# Patient Record
Sex: MALE | Race: WHITE | Employment: OTHER | URBAN - METROPOLITAN AREA
[De-identification: names, ages, dates, MRNs, and addresses within clinical notes are randomized per-mention and may not be internally consistent; named-entity substitution may affect disease eponyms.]

---

## 2017-01-17 DIAGNOSIS — B35.1 ONYCHOMYCOSIS: ICD-10-CM

## 2017-01-18 RX ORDER — TERBINAFINE HYDROCHLORIDE 250 MG/1
TABLET ORAL
Qty: 90 TABLET | Refills: 0 | OUTPATIENT
Start: 2017-01-18

## 2017-02-06 ENCOUNTER — OFFICE VISIT (OUTPATIENT)
Dept: ENDOCRINOLOGY | Age: 67
End: 2017-02-06

## 2017-02-06 VITALS
HEART RATE: 79 BPM | BODY MASS INDEX: 34.75 KG/M2 | OXYGEN SATURATION: 93 % | WEIGHT: 234.6 LBS | HEIGHT: 69 IN | DIASTOLIC BLOOD PRESSURE: 76 MMHG | SYSTOLIC BLOOD PRESSURE: 138 MMHG | RESPIRATION RATE: 16 BRPM

## 2017-02-06 DIAGNOSIS — E78.2 MIXED HYPERLIPIDEMIA: ICD-10-CM

## 2017-02-06 DIAGNOSIS — I10 ESSENTIAL HYPERTENSION: ICD-10-CM

## 2017-02-06 LAB — HBA1C MFR BLD: 6.5 %

## 2017-02-06 PROCEDURE — 83036 HEMOGLOBIN GLYCOSYLATED A1C: CPT | Performed by: INTERNAL MEDICINE

## 2017-02-06 PROCEDURE — 99214 OFFICE O/P EST MOD 30 MIN: CPT | Performed by: INTERNAL MEDICINE

## 2017-04-17 ENCOUNTER — OFFICE VISIT (OUTPATIENT)
Dept: FAMILY MEDICINE CLINIC | Age: 67
End: 2017-04-17

## 2017-04-17 VITALS
HEART RATE: 74 BPM | RESPIRATION RATE: 16 BRPM | SYSTOLIC BLOOD PRESSURE: 124 MMHG | DIASTOLIC BLOOD PRESSURE: 70 MMHG | WEIGHT: 235 LBS | BODY MASS INDEX: 34.8 KG/M2 | OXYGEN SATURATION: 97 % | HEIGHT: 69 IN

## 2017-04-17 DIAGNOSIS — Z79.4 CONTROLLED TYPE 2 DIABETES MELLITUS WITH COMPLICATION, WITH LONG-TERM CURRENT USE OF INSULIN (HCC): ICD-10-CM

## 2017-04-17 DIAGNOSIS — E78.2 MIXED HYPERLIPIDEMIA: ICD-10-CM

## 2017-04-17 DIAGNOSIS — Z00.00 ANNUAL PHYSICAL EXAM: Primary | ICD-10-CM

## 2017-04-17 DIAGNOSIS — E11.8 CONTROLLED TYPE 2 DIABETES MELLITUS WITH COMPLICATION, WITH LONG-TERM CURRENT USE OF INSULIN (HCC): ICD-10-CM

## 2017-04-17 DIAGNOSIS — I10 ESSENTIAL HYPERTENSION: ICD-10-CM

## 2017-04-17 LAB
A/G RATIO: 1.7 (ref 1.1–2.2)
ALBUMIN SERPL-MCNC: 4.3 G/DL (ref 3.4–5)
ALP BLD-CCNC: 50 U/L (ref 40–129)
ALT SERPL-CCNC: 20 U/L (ref 10–40)
ANION GAP SERPL CALCULATED.3IONS-SCNC: 17 MMOL/L (ref 3–16)
AST SERPL-CCNC: 15 U/L (ref 15–37)
BILIRUB SERPL-MCNC: 0.3 MG/DL (ref 0–1)
BUN BLDV-MCNC: 21 MG/DL (ref 7–20)
CALCIUM SERPL-MCNC: 9.6 MG/DL (ref 8.3–10.6)
CHLORIDE BLD-SCNC: 102 MMOL/L (ref 99–110)
CHOLESTEROL, TOTAL: 122 MG/DL (ref 0–199)
CO2: 22 MMOL/L (ref 21–32)
CREAT SERPL-MCNC: 0.7 MG/DL (ref 0.8–1.3)
CREATININE URINE: 117.1 MG/DL (ref 39–259)
GFR AFRICAN AMERICAN: >60
GFR NON-AFRICAN AMERICAN: >60
GLOBULIN: 2.6 G/DL
GLUCOSE BLD-MCNC: 104 MG/DL (ref 70–99)
HCT VFR BLD CALC: 39.9 % (ref 40.5–52.5)
HDLC SERPL-MCNC: 26 MG/DL (ref 40–60)
HEMOGLOBIN: 13.4 G/DL (ref 13.5–17.5)
HEPATITIS C ANTIBODY INTERPRETATION: NORMAL
LDL CHOLESTEROL CALCULATED: 57 MG/DL
MCH RBC QN AUTO: 31.6 PG (ref 26–34)
MCHC RBC AUTO-ENTMCNC: 33.6 G/DL (ref 31–36)
MCV RBC AUTO: 94.1 FL (ref 80–100)
MICROALBUMIN UR-MCNC: 4.6 MG/DL
MICROALBUMIN/CREAT UR-RTO: 39.3 MG/G (ref 0–30)
PDW BLD-RTO: 13.4 % (ref 12.4–15.4)
PLATELET # BLD: 201 K/UL (ref 135–450)
PMV BLD AUTO: 9.4 FL (ref 5–10.5)
POTASSIUM SERPL-SCNC: 3.8 MMOL/L (ref 3.5–5.1)
RBC # BLD: 4.24 M/UL (ref 4.2–5.9)
SODIUM BLD-SCNC: 141 MMOL/L (ref 136–145)
TOTAL PROTEIN: 6.9 G/DL (ref 6.4–8.2)
TRIGL SERPL-MCNC: 197 MG/DL (ref 0–150)
VLDLC SERPL CALC-MCNC: 39 MG/DL
WBC # BLD: 6.3 K/UL (ref 4–11)

## 2017-04-17 PROCEDURE — 99396 PREV VISIT EST AGE 40-64: CPT | Performed by: FAMILY MEDICINE

## 2017-04-17 PROCEDURE — 36415 COLL VENOUS BLD VENIPUNCTURE: CPT | Performed by: FAMILY MEDICINE

## 2017-04-17 PROCEDURE — G8598 ASA/ANTIPLAT THER USED: HCPCS | Performed by: FAMILY MEDICINE

## 2017-04-18 LAB — HIV-1 AND HIV-2 ANTIBODIES: NORMAL

## 2017-04-20 DIAGNOSIS — E78.5 HYPERLIPIDEMIA: ICD-10-CM

## 2017-04-21 RX ORDER — GEMFIBROZIL 600 MG/1
TABLET, FILM COATED ORAL
Qty: 180 TABLET | Refills: 3 | Status: SHIPPED | OUTPATIENT
Start: 2017-04-21 | End: 2018-05-01 | Stop reason: SDUPTHER

## 2017-05-10 ENCOUNTER — OFFICE VISIT (OUTPATIENT)
Dept: ENDOCRINOLOGY | Age: 67
End: 2017-05-10

## 2017-05-10 VITALS
OXYGEN SATURATION: 96 % | SYSTOLIC BLOOD PRESSURE: 130 MMHG | BODY MASS INDEX: 34.9 KG/M2 | WEIGHT: 235.6 LBS | RESPIRATION RATE: 14 BRPM | DIASTOLIC BLOOD PRESSURE: 75 MMHG | HEART RATE: 71 BPM | HEIGHT: 69 IN

## 2017-05-10 DIAGNOSIS — I10 ESSENTIAL HYPERTENSION: ICD-10-CM

## 2017-05-10 DIAGNOSIS — E78.2 MIXED HYPERLIPIDEMIA: ICD-10-CM

## 2017-05-10 LAB — HBA1C MFR BLD: 6.6 %

## 2017-05-10 PROCEDURE — 83036 HEMOGLOBIN GLYCOSYLATED A1C: CPT | Performed by: INTERNAL MEDICINE

## 2017-05-10 PROCEDURE — G8598 ASA/ANTIPLAT THER USED: HCPCS | Performed by: INTERNAL MEDICINE

## 2017-05-10 PROCEDURE — 3044F HG A1C LEVEL LT 7.0%: CPT | Performed by: INTERNAL MEDICINE

## 2017-05-10 PROCEDURE — 99214 OFFICE O/P EST MOD 30 MIN: CPT | Performed by: INTERNAL MEDICINE

## 2017-05-10 PROCEDURE — 1123F ACP DISCUSS/DSCN MKR DOCD: CPT | Performed by: INTERNAL MEDICINE

## 2017-05-10 PROCEDURE — 4040F PNEUMOC VAC/ADMIN/RCVD: CPT | Performed by: INTERNAL MEDICINE

## 2017-05-10 PROCEDURE — 3017F COLORECTAL CA SCREEN DOC REV: CPT | Performed by: INTERNAL MEDICINE

## 2017-05-10 PROCEDURE — 1036F TOBACCO NON-USER: CPT | Performed by: INTERNAL MEDICINE

## 2017-05-10 PROCEDURE — G8417 CALC BMI ABV UP PARAM F/U: HCPCS | Performed by: INTERNAL MEDICINE

## 2017-05-10 PROCEDURE — G8427 DOCREV CUR MEDS BY ELIG CLIN: HCPCS | Performed by: INTERNAL MEDICINE

## 2017-05-18 RX ORDER — LISINOPRIL 40 MG/1
TABLET ORAL
Qty: 90 TABLET | Refills: 2 | Status: SHIPPED | OUTPATIENT
Start: 2017-05-18 | End: 2017-10-30 | Stop reason: SDUPTHER

## 2017-09-13 ENCOUNTER — OFFICE VISIT (OUTPATIENT)
Dept: ENDOCRINOLOGY | Age: 67
End: 2017-09-13

## 2017-09-13 VITALS
DIASTOLIC BLOOD PRESSURE: 70 MMHG | WEIGHT: 227.4 LBS | HEART RATE: 66 BPM | RESPIRATION RATE: 12 BRPM | OXYGEN SATURATION: 95 % | BODY MASS INDEX: 33.68 KG/M2 | SYSTOLIC BLOOD PRESSURE: 129 MMHG | HEIGHT: 69 IN

## 2017-09-13 DIAGNOSIS — I10 ESSENTIAL HYPERTENSION: Chronic | ICD-10-CM

## 2017-09-13 DIAGNOSIS — E78.2 MIXED HYPERLIPIDEMIA: Chronic | ICD-10-CM

## 2017-09-13 LAB — HBA1C MFR BLD: 6.8 %

## 2017-09-13 PROCEDURE — 1123F ACP DISCUSS/DSCN MKR DOCD: CPT | Performed by: INTERNAL MEDICINE

## 2017-09-13 PROCEDURE — 99214 OFFICE O/P EST MOD 30 MIN: CPT | Performed by: INTERNAL MEDICINE

## 2017-09-13 PROCEDURE — 83036 HEMOGLOBIN GLYCOSYLATED A1C: CPT | Performed by: INTERNAL MEDICINE

## 2017-09-13 PROCEDURE — 4040F PNEUMOC VAC/ADMIN/RCVD: CPT | Performed by: INTERNAL MEDICINE

## 2017-09-13 PROCEDURE — G8417 CALC BMI ABV UP PARAM F/U: HCPCS | Performed by: INTERNAL MEDICINE

## 2017-09-13 PROCEDURE — G8598 ASA/ANTIPLAT THER USED: HCPCS | Performed by: INTERNAL MEDICINE

## 2017-09-13 PROCEDURE — 1036F TOBACCO NON-USER: CPT | Performed by: INTERNAL MEDICINE

## 2017-09-13 PROCEDURE — 3017F COLORECTAL CA SCREEN DOC REV: CPT | Performed by: INTERNAL MEDICINE

## 2017-09-13 PROCEDURE — G8427 DOCREV CUR MEDS BY ELIG CLIN: HCPCS | Performed by: INTERNAL MEDICINE

## 2017-09-13 PROCEDURE — 3046F HEMOGLOBIN A1C LEVEL >9.0%: CPT | Performed by: INTERNAL MEDICINE

## 2017-09-27 ENCOUNTER — TELEPHONE (OUTPATIENT)
Dept: ENDOCRINOLOGY | Age: 67
End: 2017-09-27

## 2017-10-09 ENCOUNTER — TELEPHONE (OUTPATIENT)
Dept: ENDOCRINOLOGY | Age: 67
End: 2017-10-09

## 2017-10-09 ENCOUNTER — OFFICE VISIT (OUTPATIENT)
Dept: FAMILY MEDICINE CLINIC | Age: 67
End: 2017-10-09

## 2017-10-09 VITALS
TEMPERATURE: 98.3 F | WEIGHT: 226 LBS | HEART RATE: 76 BPM | BODY MASS INDEX: 33.37 KG/M2 | SYSTOLIC BLOOD PRESSURE: 132 MMHG | DIASTOLIC BLOOD PRESSURE: 68 MMHG | OXYGEN SATURATION: 96 %

## 2017-10-09 DIAGNOSIS — Z13.6 SCREENING FOR AAA (AORTIC ABDOMINAL ANEURYSM): ICD-10-CM

## 2017-10-09 DIAGNOSIS — H93.13 TINNITUS, BILATERAL: Primary | ICD-10-CM

## 2017-10-09 PROCEDURE — G8417 CALC BMI ABV UP PARAM F/U: HCPCS | Performed by: FAMILY MEDICINE

## 2017-10-09 PROCEDURE — G8484 FLU IMMUNIZE NO ADMIN: HCPCS | Performed by: FAMILY MEDICINE

## 2017-10-09 PROCEDURE — 4040F PNEUMOC VAC/ADMIN/RCVD: CPT | Performed by: FAMILY MEDICINE

## 2017-10-09 PROCEDURE — 99213 OFFICE O/P EST LOW 20 MIN: CPT | Performed by: FAMILY MEDICINE

## 2017-10-09 PROCEDURE — G8598 ASA/ANTIPLAT THER USED: HCPCS | Performed by: FAMILY MEDICINE

## 2017-10-09 PROCEDURE — 1123F ACP DISCUSS/DSCN MKR DOCD: CPT | Performed by: FAMILY MEDICINE

## 2017-10-09 PROCEDURE — 1036F TOBACCO NON-USER: CPT | Performed by: FAMILY MEDICINE

## 2017-10-09 PROCEDURE — 3017F COLORECTAL CA SCREEN DOC REV: CPT | Performed by: FAMILY MEDICINE

## 2017-10-09 PROCEDURE — G8427 DOCREV CUR MEDS BY ELIG CLIN: HCPCS | Performed by: FAMILY MEDICINE

## 2017-10-09 ASSESSMENT — ENCOUNTER SYMPTOMS
EYES NEGATIVE: 1
SHORTNESS OF BREATH: 0
ANAL BLEEDING: 0
ABDOMINAL PAIN: 0
CHEST TIGHTNESS: 0

## 2017-10-10 RX ORDER — CHLORTHALIDONE 25 MG/1
TABLET ORAL
Qty: 90 TABLET | Refills: 3 | Status: SHIPPED | OUTPATIENT
Start: 2017-10-10 | End: 2018-11-09 | Stop reason: SDUPTHER

## 2017-10-30 ENCOUNTER — OFFICE VISIT (OUTPATIENT)
Dept: CARDIOLOGY CLINIC | Age: 67
End: 2017-10-30

## 2017-10-30 VITALS
BODY MASS INDEX: 32.5 KG/M2 | OXYGEN SATURATION: 95 % | WEIGHT: 219.4 LBS | HEART RATE: 85 BPM | DIASTOLIC BLOOD PRESSURE: 66 MMHG | SYSTOLIC BLOOD PRESSURE: 116 MMHG | HEIGHT: 69 IN

## 2017-10-30 DIAGNOSIS — I25.10 CORONARY ARTERY DISEASE INVOLVING NATIVE CORONARY ARTERY OF NATIVE HEART WITHOUT ANGINA PECTORIS: Primary | ICD-10-CM

## 2017-10-30 DIAGNOSIS — E78.2 MIXED HYPERLIPIDEMIA: Chronic | ICD-10-CM

## 2017-10-30 DIAGNOSIS — I10 ESSENTIAL HYPERTENSION: Chronic | ICD-10-CM

## 2017-10-30 PROCEDURE — 1123F ACP DISCUSS/DSCN MKR DOCD: CPT | Performed by: INTERNAL MEDICINE

## 2017-10-30 PROCEDURE — 3017F COLORECTAL CA SCREEN DOC REV: CPT | Performed by: INTERNAL MEDICINE

## 2017-10-30 PROCEDURE — G8417 CALC BMI ABV UP PARAM F/U: HCPCS | Performed by: INTERNAL MEDICINE

## 2017-10-30 PROCEDURE — 93000 ELECTROCARDIOGRAM COMPLETE: CPT | Performed by: INTERNAL MEDICINE

## 2017-10-30 PROCEDURE — 4040F PNEUMOC VAC/ADMIN/RCVD: CPT | Performed by: INTERNAL MEDICINE

## 2017-10-30 PROCEDURE — G8427 DOCREV CUR MEDS BY ELIG CLIN: HCPCS | Performed by: INTERNAL MEDICINE

## 2017-10-30 PROCEDURE — 99214 OFFICE O/P EST MOD 30 MIN: CPT | Performed by: INTERNAL MEDICINE

## 2017-10-30 PROCEDURE — G8484 FLU IMMUNIZE NO ADMIN: HCPCS | Performed by: INTERNAL MEDICINE

## 2017-10-30 PROCEDURE — 1036F TOBACCO NON-USER: CPT | Performed by: INTERNAL MEDICINE

## 2017-10-30 PROCEDURE — G8598 ASA/ANTIPLAT THER USED: HCPCS | Performed by: INTERNAL MEDICINE

## 2017-10-30 RX ORDER — LISINOPRIL 40 MG/1
TABLET ORAL
Qty: 90 TABLET | Refills: 3 | Status: SHIPPED | OUTPATIENT
Start: 2017-10-30 | End: 2018-12-06 | Stop reason: SDUPTHER

## 2017-10-30 RX ORDER — CARVEDILOL 25 MG/1
TABLET ORAL
Qty: 180 TABLET | Refills: 4 | Status: SHIPPED | OUTPATIENT
Start: 2017-10-30 | End: 2018-12-03 | Stop reason: SDUPTHER

## 2017-10-30 RX ORDER — ATORVASTATIN CALCIUM 80 MG/1
TABLET, FILM COATED ORAL
Qty: 90 TABLET | Refills: 3 | Status: SHIPPED | OUTPATIENT
Start: 2017-10-30 | End: 2018-12-03 | Stop reason: SDUPTHER

## 2017-10-30 NOTE — PROGRESS NOTES
[address] Office Note  10/30/2017     Subjective:  Mr. Clary Larsen is 74 y.o. male presents today for cardiology follow up of  CAD, HLD and DM. Today he reports he feels very well overall. Denies chest pain, shortness of breath, edema, dizziness, palpitations and syncope. HPI:    Known history of CAD stent LAD 2006 DM HBP Hyperlipidemia    He is on secondary prevention for CAD and Is compliant with therapy. Review of Systems: 12 point ROS negative in all areas as listed below except as in [address]  Denies edema orthopnea PND or angina. Constitutional, EENT, Cardiovascular, pulmonary, GI, , Musculoskeletal, skin, neurological, hematological, endocrine, Psychiatric    Reviewed past medical history, social, and family history. Nonsmoker no alcohol or recreational drugs  Past Medical History:   Diagnosis Date    CAD (coronary artery disease)     Heart attack 3/2006    Hyperlipidemia     Hypertension     PONV (postoperative nausea and vomiting)     Type II or unspecified type diabetes mellitus without mention of complication, not stated as uncontrolled      Past Surgical History:   Procedure Laterality Date    CARDIAC SURGERY  2006    PTCA, stent LAD, Dr Nichols Code  2008    right rotator cuff tear, [place]    SHOULDER SURGERY Left 1/2015    TONSILLECTOMY  1979       Objective:   /66   Pulse 85   Ht 5' 9\" (1.753 m)   Wt 219 lb 6.4 oz (99.5 kg)   SpO2 95%   BMI 32.40 kg/m²     Wt Readings from Last 3 Encounters:   10/30/17 219 lb 6.4 oz (99.5 kg)   10/09/17 226 lb (102.5 kg)   09/13/17 227 lb 6.4 oz (103.1 kg)       Physical Exam:  General: No Respiratory distress, appears well developed and well nourished.    Eyes:  Sclera nonicteric  Nose/Sinuses:  negative findings: nose shows no deformity, asymmetry, or inflammation, nasal mucosa normal, septum midline with no perforation or bleeding  Back:  no pain to palpation  Joint:  no 2/27/2015 08:52   Ref.  Range 1/29/2015 07:07   Sodium Latest Range: 136-145 mmol/L 143   Potassium Latest Range: 3.5-5.1 mmol/L 4.1   Chloride Latest Range:  mmol/L 104   CO2 Latest Range: 21-32 mmol/L 24   Anion Gap Latest Range: 3-16  15   Glucose Latest Range: 70-99 mg/dL 112 (H)   BUN Latest Range: 7-20 mg/dL 21 (H)   Creatinine Latest Range: 0.8-1.3 mg/dL 0.8   Alk Phos Latest Range:  U/L 62   Calcium Latest Range: 8.3-10.6 mg/dL 9.5   Albumin/Globulin Ratio Latest Range: 1.1-2.2  1.3   GFR  Latest Range: >60  >60   GFR Non- Latest Range: >60  >60   Total Protein Latest Range: 6.4-8.2 g/dL 7.5   Albumin Latest Range: 3.4-5.0 g/dL 4.3   ALT Latest Range: 10-40 U/L 17   AST Latest Range: 15-37 U/L 15   Bilirubin Latest Range: 0.0-1.0 mg/dL 0.3   Cholesterol, Total Latest Range: 0-199 mg/dL 102   HDL Cholesterol Latest Range: 40-60 mg/dl 23 (L)   LDL Calculated Latest Range: <100 mg/dL 57   Triglycerides Latest Range: 0-150 mg/dL 111   VLDL CHOLESTEROL CALCULATED Latest Range: Not Established mg/dL 22   Globulin No range found 3.2   eAG (mg/dL) No range found 148.5   Hemoglobin A1C Latest Range: See comment % 6.8     CBC: 12/8/14  BMP: 1/29/15  LIVER PROFILE: .  LIPID: 1/29/15  Lab Results   Component Value Date    CHOL 122 04/17/2017    CHOL 128 03/31/2016    CHOL 107 12/10/2015         CHOL                    128      3/31/16  Lab Results   Component Value Date    TRIG 197 (H) 04/17/2017    TRIG 99 03/31/2016    TRIG 211 (H) 12/10/2015         TRIG   99      3/31/16   Lab Results   Component Value Date    HDL 26 (L) 04/17/2017    HDL 31 (L) 03/31/2016    HDL 22 (L) 12/10/2015        HDL                        31      3/31/16  Lab Results   Component Value Date    LDLCALC 57 04/17/2017    LDLCALC 77 03/31/2016    LDLCALC 43 12/10/2015        LDL                        77      3/31/16  Lab Results   Component Value Date    LABVLDL 39 04/17/2017    LABVLDL 20 03/31/2016 Optimal control Lipids 1/29/15 , HDL 23, LDL 57, Trig 111   DM: Stable followed by PCP    Plan:  1. Continue current medication regimen, reviewed with patient  2. Healthy lifestyle education reviewed including nutrition, exercise and activity  3.  Follow up with me as scheduled in a year    Viola Koenig MD 10/30/2017 8:20 AM

## 2017-10-30 NOTE — LETTER
[address] Cardiology - [address] [place] [id number]  Phone: [phone]  Fax: [phone] [address] [place], [place]        October 30, 2017     Amadeo Johnson    Patient: Ray Quintanilla  MR Number: [id number]  YOB: 1950  Date of Visit: 10/30/2017    Dear Dr. Marjan Rocha:    Thank you for the request for consultation for Ray Quintanilla to me for the evaluation. Below are the relevant portions of my assessment and plan of care. [facility] Office Note  10/30/2017     Subjective:  Mr. Raissa Kline is 74 y.o. male presents today for cardiology follow up of  CAD, HLD and DM. Today he reports he feels very well overall. Denies chest pain, shortness of breath, edema, dizziness, palpitations and syncope. HPI:    Known history of CAD stent LAD 2006 DM HBP Hyperlipidemia    He is on secondary prevention for CAD and Is compliant with therapy. Review of Systems: 12 point ROS negative in all areas as listed below except as in [address]  Denies edema orthopnea PND or angina. Constitutional, EENT, Cardiovascular, pulmonary, GI, , Musculoskeletal, skin, neurological, hematological, endocrine, Psychiatric    Reviewed past medical history, social, and family history.    Nonsmoker no alcohol or recreational drugs  Past Medical History:   Diagnosis Date    CAD (coronary artery disease)     Heart attack 3/2006    Hyperlipidemia     Hypertension     PONV (postoperative nausea and vomiting)     Type II or unspecified type diabetes mellitus without mention of complication, not stated as uncontrolled      Past Surgical History:   Procedure Laterality Date    CARDIAC SURGERY  2006    PTCA, stent JAIR, Dr Kisha Newman  2008    right rotator cuff tear, [place]    SHOULDER SURGERY Left 1/2015    TONSILLECTOMY  1979       Objective: /66   Pulse 85   Ht 5' 9\" (1.753 m)   Wt 219 lb 6.4 oz (99.5 kg)   SpO2 95%   BMI 32.40 kg/m²      Wt Readings from Last 3 Encounters:   10/30/17 219 lb 6.4 oz (99.5 kg)   10/09/17 226 lb (102.5 kg)   09/13/17 227 lb 6.4 oz (103.1 kg)       Physical Exam:  General: No Respiratory distress, appears well developed and well nourished. Eyes:  Sclera nonicteric  Nose/Sinuses:  negative findings: nose shows no deformity, asymmetry, or inflammation, nasal mucosa normal, septum midline with no perforation or bleeding  Back:  no pain to palpation  Joint:  no active joint inflammation  Musculoskeletal:  negative  Skin:  Warm and dry  Neck:  Negative for JVD and Carotid Bruits. Chest:  Clear to auscultation, respiration easy  Cardiovascular:  RRR, S1S2 normal, no murmur, no rub or thrill. Abdomen:  Soft normal liver and spleen  Extremities:   No edema, clubbing, cyanosis,  Pulses: Femoral and pedal pulses are normal.  Neuro: intact    Medications:   Outpatient Encounter Prescriptions as of 10/30/2017   Medication Sig Dispense Refill    chlorthalidone (HYGROTON) 25 MG tablet TAKE ONE TABLET BY MOUTH ONE TIME A DAY 90 tablet 3    insulin regular human (HUMULIN R U-500 KWIKPEN) 500 UNIT/ML SOPN concentrated injection pen Inject 60-80 units before breakfast, 60- 80 units before supper.  10 Pen 2    Liraglutide (VICTOZA) 18 MG/3ML SOPN SC injection Inject 1.2 mg into the skin daily 6 Pen 3    lisinopril (PRINIVIL;ZESTRIL) 40 MG tablet TAKE ONE TABLET BY MOUTH ONE TIME A DAY 90 tablet 2    gemfibrozil (LOPID) 600 MG tablet TAKE ONE TABLET BY MOUTH TWICE A  tablet 3    atorvastatin (LIPITOR) 80 MG tablet TAKE 1 TABLET BY MOUTH ONCE DAILY 90 tablet 3    carvedilol (COREG) 25 MG tablet TAKE ONE TABLET BY MOUTH TWICE A DAY WITH MEALS 180 tablet 4    Insulin Pen Needle 32G X 4 MM MISC 1 each by Does not apply route 3 times daily 30 each 3    AGAMATRIX PRESTO TEST strip USE TO TEST 4 TIMES DAILY 400 each 3  Blood Glucose Monitoring Suppl (AGAMATRIX PRESTO) W/DEVICE KIT 1 each by Does not apply route 5 times daily 1 kit 0    AGAMATRIX ULTRA-THIN LANCETS MISC 1 each by Does not apply route 4 times daily.  Omega-3 Fatty Acids (FISH OIL) 1000 MG CAPS Take 3,000 mg by mouth 6 times daily.  aspirin 81 MG tablet Take 81 mg by mouth daily.  Multiple Vitamins-Minerals (MULTIVITAMIN PO) Take  by mouth daily. No facility-administered encounter medications on file as of 10/30/2017. Lab Data:  Results for Staci Livingston (MRN [id number]) as of 2/27/2015 08:52   Ref.  Range 1/29/2015 07:07   Sodium Latest Range: 136-145 mmol/L 143   Potassium Latest Range: 3.5-5.1 mmol/L 4.1   Chloride Latest Range:  mmol/L 104   CO2 Latest Range: 21-32 mmol/L 24   Anion Gap Latest Range: 3-16  15   Glucose Latest Range: 70-99 mg/dL 112 (H)   BUN Latest Range: 7-20 mg/dL 21 (H)   Creatinine Latest Range: 0.8-1.3 mg/dL 0.8   Alk Phos Latest Range:  U/L 62   Calcium Latest Range: 8.3-10.6 mg/dL 9.5   Albumin/Globulin Ratio Latest Range: 1.1-2.2  1.3   GFR  Latest Range: >60  >60   GFR Non- Latest Range: >60  >60   Total Protein Latest Range: 6.4-8.2 g/dL 7.5   Albumin Latest Range: 3.4-5.0 g/dL 4.3   ALT Latest Range: 10-40 U/L 17   AST Latest Range: 15-37 U/L 15   Bilirubin Latest Range: 0.0-1.0 mg/dL 0.3   Cholesterol, Total Latest Range: 0-199 mg/dL 102   HDL Cholesterol Latest Range: 40-60 mg/dl 23 (L)   LDL Calculated Latest Range: <100 mg/dL 57   Triglycerides Latest Range: 0-150 mg/dL 111   VLDL CHOLESTEROL CALCULATED Latest Range: Not Established mg/dL 22   Globulin No range found 3.2   eAG (mg/dL) No range found 148.5   Hemoglobin A1C Latest Range: See comment % 6.8     CBC: 12/8/14  BMP: 1/29/15  LIVER PROFILE: .  LIPID: 1/29/15  Lab Results   Component Value Date    CHOL 122 04/17/2017    CHOL 128 03/31/2016    CHOL 107 12/10/2015 leaflets and trivial mitral regurgitation. The aortic valve is  structurally normal. The tricuspid valve is structurally. The  pulmonic valve is structurally normal. There is no significant  pericardial effusion. The aortic root is borderline dilated. The  vena cava is not well seen. IMPRESSION-   1. Hypertrophied left ventricle with normal global systolic  function, with wall motion abnormalities as above. 2. Stage I diastolic dysfunction of the left ventricle. 3. Mild left atrial enlargement. 4. Mitral anular calcification. 5. Borderline dilated aortic root. Assessment:  HTN:  controlled  CAD:   no angina or heart failure. HLD:  Optimal control Lipids 1/29/15 , HDL 23, LDL 57, Trig 111   DM: Stable followed by PCP    Plan:  1. Continue current medication regimen, reviewed with patient  2. Healthy lifestyle education reviewed including nutrition, exercise and activity  3. Follow up with me as scheduled in a year    Migel Sanchez MD 10/30/2017 8:20 AM          If you have questions, please do not hesitate to call me. I look forward to following Alex Edmondson along with you.     Sincerely,        Rudy Herron MD

## 2017-10-30 NOTE — COMMUNICATION BODY
active joint inflammation  Musculoskeletal:  negative  Skin:  Warm and dry  Neck:  Negative for JVD and Carotid Bruits. Chest:  Clear to auscultation, respiration easy  Cardiovascular:  RRR, S1S2 normal, no murmur, no rub or thrill. Abdomen:  Soft normal liver and spleen  Extremities:   No edema, clubbing, cyanosis,  Pulses: Femoral and pedal pulses are normal.  Neuro: intact    Medications:   Outpatient Encounter Prescriptions as of 10/30/2017   Medication Sig Dispense Refill    chlorthalidone (HYGROTON) 25 MG tablet TAKE ONE TABLET BY MOUTH ONE TIME A DAY 90 tablet 3    insulin regular human (HUMULIN R U-500 KWIKPEN) 500 UNIT/ML SOPN concentrated injection pen Inject 60-80 units before breakfast, 60- 80 units before supper. 10 Pen 2    Liraglutide (VICTOZA) 18 MG/3ML SOPN SC injection Inject 1.2 mg into the skin daily 6 Pen 3    lisinopril (PRINIVIL;ZESTRIL) 40 MG tablet TAKE ONE TABLET BY MOUTH ONE TIME A DAY 90 tablet 2    gemfibrozil (LOPID) 600 MG tablet TAKE ONE TABLET BY MOUTH TWICE A  tablet 3    atorvastatin (LIPITOR) 80 MG tablet TAKE 1 TABLET BY MOUTH ONCE DAILY 90 tablet 3    carvedilol (COREG) 25 MG tablet TAKE ONE TABLET BY MOUTH TWICE A DAY WITH MEALS 180 tablet 4    Insulin Pen Needle 32G X 4 MM MISC 1 each by Does not apply route 3 times daily 30 each 3    AGAMATRIX PRESTO TEST strip USE TO TEST 4 TIMES DAILY 400 each 3    Blood Glucose Monitoring Suppl (AGAMATRIX PRESTO) W/DEVICE KIT 1 each by Does not apply route 5 times daily 1 kit 0    AGAMATRIX ULTRA-THIN LANCETS MISC 1 each by Does not apply route 4 times daily.  Omega-3 Fatty Acids (FISH OIL) 1000 MG CAPS Take 3,000 mg by mouth 6 times daily.  aspirin 81 MG tablet Take 81 mg by mouth daily.  Multiple Vitamins-Minerals (MULTIVITAMIN PO) Take  by mouth daily. No facility-administered encounter medications on file as of 10/30/2017.          Lab Data:  Results for Юлия Mccoy (MRN [id number]) as of 2/27/2015 08:52   Ref.  Range 1/29/2015 07:07   Sodium Latest Range: 136-145 mmol/L 143   Potassium Latest Range: 3.5-5.1 mmol/L 4.1   Chloride Latest Range:  mmol/L 104   CO2 Latest Range: 21-32 mmol/L 24   Anion Gap Latest Range: 3-16  15   Glucose Latest Range: 70-99 mg/dL 112 (H)   BUN Latest Range: 7-20 mg/dL 21 (H)   Creatinine Latest Range: 0.8-1.3 mg/dL 0.8   Alk Phos Latest Range:  U/L 62   Calcium Latest Range: 8.3-10.6 mg/dL 9.5   Albumin/Globulin Ratio Latest Range: 1.1-2.2  1.3   GFR  Latest Range: >60  >60   GFR Non- Latest Range: >60  >60   Total Protein Latest Range: 6.4-8.2 g/dL 7.5   Albumin Latest Range: 3.4-5.0 g/dL 4.3   ALT Latest Range: 10-40 U/L 17   AST Latest Range: 15-37 U/L 15   Bilirubin Latest Range: 0.0-1.0 mg/dL 0.3   Cholesterol, Total Latest Range: 0-199 mg/dL 102   HDL Cholesterol Latest Range: 40-60 mg/dl 23 (L)   LDL Calculated Latest Range: <100 mg/dL 57   Triglycerides Latest Range: 0-150 mg/dL 111   VLDL CHOLESTEROL CALCULATED Latest Range: Not Established mg/dL 22   Globulin No range found 3.2   eAG (mg/dL) No range found 148.5   Hemoglobin A1C Latest Range: See comment % 6.8     CBC: 12/8/14  BMP: 1/29/15  LIVER PROFILE: .  LIPID: 1/29/15  Lab Results   Component Value Date    CHOL 122 04/17/2017    CHOL 128 03/31/2016    CHOL 107 12/10/2015         CHOL                    128      3/31/16  Lab Results   Component Value Date    TRIG 197 (H) 04/17/2017    TRIG 99 03/31/2016    TRIG 211 (H) 12/10/2015         TRIG   99      3/31/16   Lab Results   Component Value Date    HDL 26 (L) 04/17/2017    HDL 31 (L) 03/31/2016    HDL 22 (L) 12/10/2015        HDL                        31      3/31/16  Lab Results   Component Value Date    LDLCALC 57 04/17/2017    LDLCALC 77 03/31/2016    LDLCALC 43 12/10/2015        LDL                        77      3/31/16  Lab Results   Component Value Date    LABVLDL 39 04/17/2017    LABVLDL 20 03/31/2016

## 2017-11-02 ENCOUNTER — HOSPITAL ENCOUNTER (OUTPATIENT)
Dept: OTHER | Age: 67
Discharge: HOME | End: 2017-11-02
Attending: INTERNAL MEDICINE | Admitting: INTERNAL MEDICINE

## 2017-11-02 ENCOUNTER — PATIENT MESSAGE (OUTPATIENT)
Dept: FAMILY MEDICINE CLINIC | Age: 67
End: 2017-11-02

## 2017-11-02 LAB
A/G RATIO: 1.7 (ref 1.1–2.2)
ALBUMIN SERPL-MCNC: 4.6 G/DL (ref 3.4–5)
ALP BLD-CCNC: 50 U/L (ref 40–129)
ALT SERPL-CCNC: 18 U/L (ref 10–40)
ANION GAP SERPL CALCULATED.3IONS-SCNC: 16 MMOL/L (ref 3–16)
AST SERPL-CCNC: 16 U/L (ref 15–37)
BILIRUB SERPL-MCNC: 0.4 MG/DL (ref 0–1)
BUN BLDV-MCNC: 26 MG/DL (ref 7–20)
CALCIUM SERPL-MCNC: 9.7 MG/DL (ref 8.3–10.6)
CHLORIDE BLD-SCNC: 101 MMOL/L (ref 99–110)
CHOLESTEROL, TOTAL: 108 MG/DL (ref 0–199)
CO2: 24 MMOL/L (ref 21–32)
CREAT SERPL-MCNC: 0.9 MG/DL (ref 0.8–1.3)
CREATININE URINE: 112.2 MG/DL (ref 39–259)
ESTIMATED AVERAGE GLUCOSE: 148.5 MG/DL
GFR AFRICAN AMERICAN: >60
GFR NON-AFRICAN AMERICAN: >60
GLOBULIN: 2.7 G/DL
GLUCOSE BLD-MCNC: 163 MG/DL (ref 70–99)
HBA1C MFR BLD: 6.8 %
HDLC SERPL-MCNC: 30 MG/DL (ref 40–60)
LDL CHOLESTEROL CALCULATED: 59 MG/DL
MICROALBUMIN UR-MCNC: 4.7 MG/DL
MICROALBUMIN/CREAT UR-RTO: 41.9 MG/G (ref 0–30)
POTASSIUM SERPL-SCNC: 3.8 MMOL/L (ref 3.5–5.1)
SODIUM BLD-SCNC: 141 MMOL/L (ref 136–145)
TOTAL PROTEIN: 7.3 G/DL (ref 6.4–8.2)
TRIGL SERPL-MCNC: 96 MG/DL (ref 0–150)
VLDLC SERPL CALC-MCNC: 19 MG/DL

## 2017-11-03 RX ORDER — AMOXICILLIN 875 MG/1
875 TABLET, COATED ORAL 2 TIMES DAILY
Qty: 14 TABLET | Refills: 0 | Status: SHIPPED | OUTPATIENT
Start: 2017-11-03 | End: 2017-11-10

## 2017-11-03 NOTE — TELEPHONE ENCOUNTER
I pended amoxil 875 1 bid x 7 days, but I don't know where to send it.   Pls contact pt and send to local pharm of his choice. [email or web address].

## 2017-11-17 ENCOUNTER — TELEPHONE (OUTPATIENT)
Dept: FAMILY MEDICINE CLINIC | Age: 67
End: 2017-11-17

## 2017-11-21 ENCOUNTER — NURSE ONLY (OUTPATIENT)
Dept: FAMILY MEDICINE CLINIC | Age: 67
End: 2017-11-21

## 2017-11-21 DIAGNOSIS — Z23 NEED FOR PNEUMOCOCCAL VACCINATION: Primary | ICD-10-CM

## 2017-11-21 DIAGNOSIS — Z23 NEED FOR INFLUENZA VACCINATION: ICD-10-CM

## 2017-11-21 PROCEDURE — 90471 IMMUNIZATION ADMIN: CPT | Performed by: FAMILY MEDICINE

## 2017-11-21 PROCEDURE — 90472 IMMUNIZATION ADMIN EACH ADD: CPT | Performed by: FAMILY MEDICINE

## 2017-11-21 PROCEDURE — 90662 IIV NO PRSV INCREASED AG IM: CPT | Performed by: FAMILY MEDICINE

## 2017-11-21 PROCEDURE — 90670 PCV13 VACCINE IM: CPT | Performed by: FAMILY MEDICINE

## 2017-11-21 NOTE — PROGRESS NOTES
Vaccine Information Sheet, \"Influenza - Inactivated\"  given to Chuyita Lovett, or parent/legal guardian of  Chuyita Lovett and verbalized understanding. Patient responses:    Have you ever had a reaction to a flu vaccine? No  Are you able to eat eggs without adverse effects? Yes  Do you have any current illness? No  Have you ever had Guillian Cooks Syndrome? No    Flu vaccine given per order. Please see immunization tab.

## 2017-12-06 ENCOUNTER — CLINICAL DOCUMENTATION (OUTPATIENT)
Dept: PHARMACY | Facility: CLINIC | Age: 67
End: 2017-12-06

## 2017-12-06 NOTE — LETTER
Cherelle Hendricks   [address] [place] [address]           12/06/17     Dear Garo Barron! You have completed the requirements to participate in the [facility]) Diabetes Management program for 2018. What you will receive? You will receive beginning Jan. 1 up to $600 in waived copays ($300 if entering the program on July 1) for specific medications and pharmacy-related supplies purchased through the [facility]. A list of these items is available on the [place] [employer] Diabetes Management page (Employee Quick Links > Human Resources > Benefits and Well Being > Diabetes Management) and on [employer] under the Diabetes Management tile. In addition, clinical support will be available if your A1c becomes greater than 8 percent. To reduce your health risks, our care coordinators and diabetes educators will assist you in better controlling your condition. What you will need to do? To maintain your benefit throughout the year and be eligible to receive the benefit next year, you must make sure you complete all the following ongoing requirements:  ? Requirements to be completed throughout year:  ? Take diabetes medication as prescribed as well as cholesterol (Statin) or high blood pressure (ACE/ARB), if needed. o 70% adherence is required of diabetes medications  o Provide documentation if cholesterol and/or high blood pressure medications are not needed. ? Lipid panel (once yearly)  ? Urine albumin (once yearly)  ? Pneumonia Vaccination (as indicated)  ? Requirements to be completed between Jan. 1 and June 30:  ? Visit with your physician (First yearly visit)  ? Meet with a [address] [place] [address] pharmacist in person at a hospital location or by phone (This visit may be conducted prior to the start of the requirements period.)  ? First A1c  ?  Requirements to be completed between July 1 and Dec. 31

## 2017-12-06 NOTE — PROGRESS NOTES
Pharmacy Pop Care Documentation:      The application for Chuyita Lovett for enrollment into the diabetes management program has been reviewed and accepted on 12/6/17.     Lenka Little

## 2017-12-20 ENCOUNTER — OFFICE VISIT (OUTPATIENT)
Dept: ENDOCRINOLOGY | Age: 67
End: 2017-12-20

## 2017-12-20 VITALS
RESPIRATION RATE: 14 BRPM | WEIGHT: 216.2 LBS | BODY MASS INDEX: 32.02 KG/M2 | DIASTOLIC BLOOD PRESSURE: 67 MMHG | HEART RATE: 70 BPM | OXYGEN SATURATION: 96 % | HEIGHT: 69 IN | SYSTOLIC BLOOD PRESSURE: 119 MMHG

## 2017-12-20 DIAGNOSIS — E78.2 MIXED HYPERLIPIDEMIA: Chronic | ICD-10-CM

## 2017-12-20 DIAGNOSIS — I10 ESSENTIAL HYPERTENSION: Chronic | ICD-10-CM

## 2017-12-20 PROCEDURE — 4040F PNEUMOC VAC/ADMIN/RCVD: CPT | Performed by: INTERNAL MEDICINE

## 2017-12-20 PROCEDURE — 3017F COLORECTAL CA SCREEN DOC REV: CPT | Performed by: INTERNAL MEDICINE

## 2017-12-20 PROCEDURE — 99214 OFFICE O/P EST MOD 30 MIN: CPT | Performed by: INTERNAL MEDICINE

## 2017-12-20 PROCEDURE — G8598 ASA/ANTIPLAT THER USED: HCPCS | Performed by: INTERNAL MEDICINE

## 2017-12-20 PROCEDURE — G8484 FLU IMMUNIZE NO ADMIN: HCPCS | Performed by: INTERNAL MEDICINE

## 2017-12-20 PROCEDURE — G8417 CALC BMI ABV UP PARAM F/U: HCPCS | Performed by: INTERNAL MEDICINE

## 2017-12-20 PROCEDURE — G8427 DOCREV CUR MEDS BY ELIG CLIN: HCPCS | Performed by: INTERNAL MEDICINE

## 2017-12-20 PROCEDURE — 1036F TOBACCO NON-USER: CPT | Performed by: INTERNAL MEDICINE

## 2017-12-20 PROCEDURE — 1123F ACP DISCUSS/DSCN MKR DOCD: CPT | Performed by: INTERNAL MEDICINE

## 2017-12-20 PROCEDURE — 3044F HG A1C LEVEL LT 7.0%: CPT | Performed by: INTERNAL MEDICINE

## 2018-01-25 ENCOUNTER — HOSPITAL ENCOUNTER (OUTPATIENT)
Dept: ULTRASOUND IMAGING | Age: 68
Discharge: HOME | End: 2018-01-25
Attending: FAMILY MEDICINE | Admitting: FAMILY MEDICINE

## 2018-01-25 DIAGNOSIS — Z13.6 SCREENING FOR AAA (ABDOMINAL AORTIC ANEURYSM): ICD-10-CM

## 2018-01-25 DIAGNOSIS — Z13.6 ENCOUNTER FOR SCREENING FOR CARDIOVASCULAR DISORDERS: ICD-10-CM

## 2018-02-07 ENCOUNTER — OFFICE VISIT (OUTPATIENT)
Dept: ORTHOPEDIC SURGERY | Age: 68
End: 2018-02-07

## 2018-02-07 VITALS
HEART RATE: 87 BPM | HEIGHT: 69 IN | DIASTOLIC BLOOD PRESSURE: 73 MMHG | WEIGHT: 216.27 LBS | SYSTOLIC BLOOD PRESSURE: 119 MMHG | BODY MASS INDEX: 32.03 KG/M2

## 2018-02-07 DIAGNOSIS — R52 PAIN: Primary | ICD-10-CM

## 2018-02-07 DIAGNOSIS — M75.81 ROTATOR CUFF TENDINITIS, RIGHT: ICD-10-CM

## 2018-02-07 PROCEDURE — 1036F TOBACCO NON-USER: CPT | Performed by: ORTHOPAEDIC SURGERY

## 2018-02-07 PROCEDURE — G8598 ASA/ANTIPLAT THER USED: HCPCS | Performed by: ORTHOPAEDIC SURGERY

## 2018-02-07 PROCEDURE — 4040F PNEUMOC VAC/ADMIN/RCVD: CPT | Performed by: ORTHOPAEDIC SURGERY

## 2018-02-07 PROCEDURE — 99214 OFFICE O/P EST MOD 30 MIN: CPT | Performed by: ORTHOPAEDIC SURGERY

## 2018-02-07 PROCEDURE — G8427 DOCREV CUR MEDS BY ELIG CLIN: HCPCS | Performed by: ORTHOPAEDIC SURGERY

## 2018-02-07 PROCEDURE — 1123F ACP DISCUSS/DSCN MKR DOCD: CPT | Performed by: ORTHOPAEDIC SURGERY

## 2018-02-07 PROCEDURE — G8484 FLU IMMUNIZE NO ADMIN: HCPCS | Performed by: ORTHOPAEDIC SURGERY

## 2018-02-07 PROCEDURE — G8417 CALC BMI ABV UP PARAM F/U: HCPCS | Performed by: ORTHOPAEDIC SURGERY

## 2018-02-07 PROCEDURE — 3017F COLORECTAL CA SCREEN DOC REV: CPT | Performed by: ORTHOPAEDIC SURGERY

## 2018-02-07 RX ORDER — DICLOFENAC SODIUM 75 MG/1
75 TABLET, DELAYED RELEASE ORAL 2 TIMES DAILY WITH MEALS
Qty: 30 TABLET | Refills: 0 | Status: SHIPPED | OUTPATIENT
Start: 2018-02-07 | End: 2018-04-19

## 2018-02-08 ENCOUNTER — SCHEDULED TELEPHONE ENCOUNTER (OUTPATIENT)
Dept: PHARMACY | Facility: CLINIC | Age: 68
End: 2018-02-08

## 2018-02-08 NOTE — TELEPHONE ENCOUNTER
over 8%) - discussed with patient  [] A1c (2nd)  [] Lipid panel  [] Urine protein  [] Pneumococcal vaccination: up to date - received Prevnar 11/21/17 - advised patient to call if unable to obtain/complete Pneumovax between 11/21 and 12/15 of this year so we could likely extend deadline for him  [] Influenza vaccination for [id number]  [] Medication adherence over 70%    Formulary Medication Review:  Non-formulary or medications with cost-effective alternatives: n/a. Current medications eligible for copay waiver, up to $600, through [employer] (mail order) Pharmacy:  - aspirin, atorvastatin, Humulin R U-500, Victoza, lisinopril  - Generic ([facility] pharmacy-stocked) insulin syringes and pen needles  - [employer] meter and supplies     Other Considerations:  - Glycemic Goal: <7.0% and directed by provider.  Is at blood glucose goal.  - Blood Pressure Goal: BP less than 140/90 mmHg due to history of DM: Is at blood pressure goal   - Lipids: on high intensity statin  - Smoking status: former smoker    PLAN:  - Consideration(s) for provider:   · N/A  - DM program gaps identified:   · Initial requirements: OV with provider for DM (1st) and A1c (1st)   · Ongoing requirements: OV with provider for DM (2nd), A1c (2nd), Lipid panel, Pneumococcal vaccination: up to date, Influenza vaccination for 4061-1294, Medication adherence over 70% and urine protein   · Pneumovax (PPSV23) will be due after 11/21/18 - Pneumococcal vaccine at PCP appt or available for $0 at local outpatient pharmacies (as long as not a [facility] or [facility] pharmacy)   - Follow up: PCP for identified gaps or as scheduled below  - Upcoming appointments:   Future Appointments  Date Time Provider Estrella Khan   4/11/2018 9:20 AM Ingrid Patterson MD AND ENDO ASHLEIGH Tan, PharmD, [address] [facility]  Direct: [phone]  Department, toll free: [phone], option 7

## 2018-02-23 RX ORDER — LIRAGLUTIDE 6 MG/ML
1.2 INJECTION SUBCUTANEOUS DAILY
Qty: 6 ML | Refills: 2 | Status: SHIPPED | OUTPATIENT
Start: 2018-02-23 | End: 2018-07-31 | Stop reason: SDUPTHER

## 2018-04-11 ENCOUNTER — OFFICE VISIT (OUTPATIENT)
Dept: ENDOCRINOLOGY | Age: 68
End: 2018-04-11

## 2018-04-11 VITALS
DIASTOLIC BLOOD PRESSURE: 76 MMHG | SYSTOLIC BLOOD PRESSURE: 113 MMHG | OXYGEN SATURATION: 96 % | RESPIRATION RATE: 14 BRPM | WEIGHT: 214.4 LBS | HEART RATE: 78 BPM | BODY MASS INDEX: 31.76 KG/M2 | HEIGHT: 69 IN

## 2018-04-11 DIAGNOSIS — E78.2 MIXED HYPERLIPIDEMIA: Chronic | ICD-10-CM

## 2018-04-11 DIAGNOSIS — I10 ESSENTIAL HYPERTENSION: Chronic | ICD-10-CM

## 2018-04-11 LAB — HBA1C MFR BLD: 6.6 %

## 2018-04-11 PROCEDURE — 3044F HG A1C LEVEL LT 7.0%: CPT | Performed by: INTERNAL MEDICINE

## 2018-04-11 PROCEDURE — 83036 HEMOGLOBIN GLYCOSYLATED A1C: CPT | Performed by: INTERNAL MEDICINE

## 2018-04-11 PROCEDURE — 1036F TOBACCO NON-USER: CPT | Performed by: INTERNAL MEDICINE

## 2018-04-11 PROCEDURE — 1123F ACP DISCUSS/DSCN MKR DOCD: CPT | Performed by: INTERNAL MEDICINE

## 2018-04-11 PROCEDURE — G8598 ASA/ANTIPLAT THER USED: HCPCS | Performed by: INTERNAL MEDICINE

## 2018-04-11 PROCEDURE — 3017F COLORECTAL CA SCREEN DOC REV: CPT | Performed by: INTERNAL MEDICINE

## 2018-04-11 PROCEDURE — G8417 CALC BMI ABV UP PARAM F/U: HCPCS | Performed by: INTERNAL MEDICINE

## 2018-04-11 PROCEDURE — 4040F PNEUMOC VAC/ADMIN/RCVD: CPT | Performed by: INTERNAL MEDICINE

## 2018-04-11 PROCEDURE — 2022F DILAT RTA XM EVC RTNOPTHY: CPT | Performed by: INTERNAL MEDICINE

## 2018-04-11 PROCEDURE — G8427 DOCREV CUR MEDS BY ELIG CLIN: HCPCS | Performed by: INTERNAL MEDICINE

## 2018-04-11 PROCEDURE — 99214 OFFICE O/P EST MOD 30 MIN: CPT | Performed by: INTERNAL MEDICINE

## 2018-04-19 ENCOUNTER — OFFICE VISIT (OUTPATIENT)
Dept: FAMILY MEDICINE CLINIC | Age: 68
End: 2018-04-19

## 2018-04-19 VITALS
BODY MASS INDEX: 31.7 KG/M2 | HEART RATE: 86 BPM | WEIGHT: 214 LBS | SYSTOLIC BLOOD PRESSURE: 124 MMHG | HEIGHT: 69 IN | DIASTOLIC BLOOD PRESSURE: 80 MMHG | OXYGEN SATURATION: 97 %

## 2018-04-19 DIAGNOSIS — Z12.11 COLON CANCER SCREENING: ICD-10-CM

## 2018-04-19 DIAGNOSIS — I25.10 CORONARY ARTERY DISEASE INVOLVING NATIVE CORONARY ARTERY OF NATIVE HEART WITHOUT ANGINA PECTORIS: ICD-10-CM

## 2018-04-19 DIAGNOSIS — Z00.00 ANNUAL PHYSICAL EXAM: Primary | ICD-10-CM

## 2018-04-19 DIAGNOSIS — E11.40 TYPE 2 DIABETES, CONTROLLED, WITH NEUROPATHY (HCC): ICD-10-CM

## 2018-04-19 DIAGNOSIS — E78.2 MIXED HYPERLIPIDEMIA: ICD-10-CM

## 2018-04-19 DIAGNOSIS — Z23 NEED FOR SHINGLES VACCINE: ICD-10-CM

## 2018-04-19 DIAGNOSIS — I10 ESSENTIAL HYPERTENSION: Chronic | ICD-10-CM

## 2018-04-19 LAB
HCT VFR BLD CALC: 42.9 % (ref 40.5–52.5)
HEMOGLOBIN: 14.8 G/DL (ref 13.5–17.5)
MCH RBC QN AUTO: 32.5 PG (ref 26–34)
MCHC RBC AUTO-ENTMCNC: 34.5 G/DL (ref 31–36)
MCV RBC AUTO: 94.2 FL (ref 80–100)
PDW BLD-RTO: 13.3 % (ref 12.4–15.4)
PLATELET # BLD: 216 K/UL (ref 135–450)
PMV BLD AUTO: 9.3 FL (ref 5–10.5)
RBC # BLD: 4.56 M/UL (ref 4.2–5.9)
WBC # BLD: 6.9 K/UL (ref 4–11)

## 2018-04-19 PROCEDURE — 36415 COLL VENOUS BLD VENIPUNCTURE: CPT | Performed by: FAMILY MEDICINE

## 2018-04-19 PROCEDURE — 99397 PER PM REEVAL EST PAT 65+ YR: CPT | Performed by: FAMILY MEDICINE

## 2018-04-19 ASSESSMENT — PATIENT HEALTH QUESTIONNAIRE - PHQ9
1. LITTLE INTEREST OR PLEASURE IN DOING THINGS: 0
2. FEELING DOWN, DEPRESSED OR HOPELESS: 0
SUM OF ALL RESPONSES TO PHQ QUESTIONS 1-9: 0
SUM OF ALL RESPONSES TO PHQ9 QUESTIONS 1 & 2: 0

## 2018-04-19 ASSESSMENT — ENCOUNTER SYMPTOMS
SHORTNESS OF BREATH: 0
COLOR CHANGE: 0
EYES NEGATIVE: 1
COUGH: 0
NAUSEA: 0
CHEST TIGHTNESS: 0
ABDOMINAL DISTENTION: 0
WHEEZING: 0
BLOOD IN STOOL: 0
ABDOMINAL PAIN: 0
DIARRHEA: 0
CONSTIPATION: 0
VOMITING: 0

## 2018-04-20 LAB
A/G RATIO: 2 (ref 1.1–2.2)
ALBUMIN SERPL-MCNC: 4.7 G/DL (ref 3.4–5)
ALP BLD-CCNC: 51 U/L (ref 40–129)
ALT SERPL-CCNC: 19 U/L (ref 10–40)
ANION GAP SERPL CALCULATED.3IONS-SCNC: 16 MMOL/L (ref 3–16)
AST SERPL-CCNC: 16 U/L (ref 15–37)
BILIRUB SERPL-MCNC: 0.5 MG/DL (ref 0–1)
BUN BLDV-MCNC: 16 MG/DL (ref 7–20)
CALCIUM SERPL-MCNC: 9.8 MG/DL (ref 8.3–10.6)
CHLORIDE BLD-SCNC: 99 MMOL/L (ref 99–110)
CHOLESTEROL, TOTAL: 120 MG/DL (ref 0–199)
CO2: 25 MMOL/L (ref 21–32)
CREAT SERPL-MCNC: 0.8 MG/DL (ref 0.8–1.3)
GFR AFRICAN AMERICAN: >60
GFR NON-AFRICAN AMERICAN: >60
GLOBULIN: 2.4 G/DL
GLUCOSE BLD-MCNC: 171 MG/DL (ref 70–99)
HDLC SERPL-MCNC: 34 MG/DL (ref 40–60)
LDL CHOLESTEROL CALCULATED: 71 MG/DL
POTASSIUM SERPL-SCNC: 3.8 MMOL/L (ref 3.5–5.1)
SODIUM BLD-SCNC: 140 MMOL/L (ref 136–145)
TOTAL PROTEIN: 7.1 G/DL (ref 6.4–8.2)
TRIGL SERPL-MCNC: 76 MG/DL (ref 0–150)
TSH REFLEX FT4: 1.62 UIU/ML (ref 0.27–4.2)
VLDLC SERPL CALC-MCNC: 15 MG/DL

## 2018-05-01 DIAGNOSIS — E78.5 HYPERLIPIDEMIA: ICD-10-CM

## 2018-05-01 RX ORDER — GEMFIBROZIL 600 MG/1
TABLET, FILM COATED ORAL
Qty: 180 TABLET | Refills: 1 | Status: SHIPPED | OUTPATIENT
Start: 2018-05-01 | End: 2018-11-09 | Stop reason: SDUPTHER

## 2018-08-01 RX ORDER — LIRAGLUTIDE 6 MG/ML
INJECTION SUBCUTANEOUS
Qty: 18 ML | Refills: 1 | Status: SHIPPED | OUTPATIENT
Start: 2018-08-01 | End: 2018-08-31

## 2018-08-31 ENCOUNTER — OFFICE VISIT (OUTPATIENT)
Dept: ENDOCRINOLOGY | Age: 68
End: 2018-08-31

## 2018-08-31 VITALS
SYSTOLIC BLOOD PRESSURE: 125 MMHG | DIASTOLIC BLOOD PRESSURE: 67 MMHG | RESPIRATION RATE: 12 BRPM | BODY MASS INDEX: 32.26 KG/M2 | OXYGEN SATURATION: 97 % | WEIGHT: 217.8 LBS | HEIGHT: 69 IN | HEART RATE: 64 BPM

## 2018-08-31 DIAGNOSIS — E78.2 MIXED HYPERLIPIDEMIA: Chronic | ICD-10-CM

## 2018-08-31 DIAGNOSIS — I10 ESSENTIAL HYPERTENSION: Chronic | ICD-10-CM

## 2018-08-31 LAB — HBA1C MFR BLD: 7.3 %

## 2018-08-31 PROCEDURE — 1101F PT FALLS ASSESS-DOCD LE1/YR: CPT | Performed by: INTERNAL MEDICINE

## 2018-08-31 PROCEDURE — 99214 OFFICE O/P EST MOD 30 MIN: CPT | Performed by: INTERNAL MEDICINE

## 2018-08-31 PROCEDURE — G8427 DOCREV CUR MEDS BY ELIG CLIN: HCPCS | Performed by: INTERNAL MEDICINE

## 2018-08-31 PROCEDURE — G8417 CALC BMI ABV UP PARAM F/U: HCPCS | Performed by: INTERNAL MEDICINE

## 2018-08-31 PROCEDURE — 1036F TOBACCO NON-USER: CPT | Performed by: INTERNAL MEDICINE

## 2018-08-31 PROCEDURE — 2022F DILAT RTA XM EVC RTNOPTHY: CPT | Performed by: INTERNAL MEDICINE

## 2018-08-31 PROCEDURE — 1123F ACP DISCUSS/DSCN MKR DOCD: CPT | Performed by: INTERNAL MEDICINE

## 2018-08-31 PROCEDURE — 3017F COLORECTAL CA SCREEN DOC REV: CPT | Performed by: INTERNAL MEDICINE

## 2018-08-31 PROCEDURE — 4040F PNEUMOC VAC/ADMIN/RCVD: CPT | Performed by: INTERNAL MEDICINE

## 2018-08-31 PROCEDURE — G8598 ASA/ANTIPLAT THER USED: HCPCS | Performed by: INTERNAL MEDICINE

## 2018-08-31 PROCEDURE — 3045F PR MOST RECENT HEMOGLOBIN A1C LEVEL 7.0-9.0%: CPT | Performed by: INTERNAL MEDICINE

## 2018-08-31 PROCEDURE — 83036 HEMOGLOBIN GLYCOSYLATED A1C: CPT | Performed by: INTERNAL MEDICINE

## 2018-08-31 NOTE — PROGRESS NOTES
diaphoretic. Psychiatric: His behavior is normal. Thought content normal.                  No visits with results within 1 Month(s) from this visit. Latest known visit with results is:   Orders Only on 06/28/2018   Component Date Value Ref Range Status    OD Visual Acuity Distance 06/28/2018 20/25   Final    Visual Acuity Distance Eye 06/28/2018 20/30   Final    Intraocular Pressure Eye 06/28/2018    Final                    Value:17  17      Diabetic Retinopathy 06/28/2018 POSITIVE - MONITOR   Final    Cataracts 06/28/2018 POSITIVE   Final    Glaucoma 06/28/2018 NEGATIVE   Final         Assessment/Plan    1. Type 2 DM    This 79 y.o. male has Type 2 DM with obesity and insulin resistance. DM is complicated by retinopathy. A1c 10.8 %--->8.8 %--- > 8.1 %--->6.6 %.--> 6.8 %---> 6.8 % ---> 6.5 %---> 6.7 % --->7.9 % ---> 6.5 %--->6.6 % ---> 6.8 % --> 6.8 % ---> 6.6 % ---> 7.3 %     He has lost weight  Insulin requirements have decreased. Continue same dose of insulin. Having GI effects with victoza. Will DC victoza    Given h/o CAD and high A1c, will add jardiance 10 mg daily. Reviewed side effects. Continue follow-up with the ophthalmologist for retinopathy every 6 months. Urine microalbumin/cr slightly high in 03/16 and 04/17. On lisinopril. Has peripheral neuropathy on exam. Discussed foot care. Pt on ASA. Former Smoker. 2. Hypertension. BP at goal today. 3. Hyperlipidemia. On fibrates, statins and fish oil. TGD have improved significantly with weight loss and treatment. Triglycerides 824---->230--->211---> 111---> 255---> 211---> 197---> 96-----> 76                                LDL 57---> 59---> 71                               HDL 26--> 30---> 34    4. CAD s/p MI. On ASA/Plavix. Follows with cards.

## 2018-08-31 NOTE — PATIENT INSTRUCTIONS
Lab Results   Component Value Date    LABA1C 7.3 08/31/2018     Lab Results   Component Value Date    .5 11/02/2017

## 2018-09-21 ENCOUNTER — TELEPHONE (OUTPATIENT)
Dept: ENDOCRINOLOGY | Age: 68
End: 2018-09-21

## 2018-11-07 ENCOUNTER — NURSE ONLY (OUTPATIENT)
Dept: FAMILY MEDICINE CLINIC | Age: 68
End: 2018-11-07
Payer: COMMERCIAL

## 2018-11-07 DIAGNOSIS — Z23 NEED FOR VACCINATION: ICD-10-CM

## 2018-11-07 LAB
CREATININE URINE: 74.4 MG/DL (ref 39–259)
MICROALBUMIN UR-MCNC: 5.1 MG/DL
MICROALBUMIN/CREAT UR-RTO: 68.5 MG/G (ref 0–30)

## 2018-11-07 PROCEDURE — 90732 PPSV23 VACC 2 YRS+ SUBQ/IM: CPT | Performed by: FAMILY MEDICINE

## 2018-11-07 PROCEDURE — 90472 IMMUNIZATION ADMIN EACH ADD: CPT | Performed by: FAMILY MEDICINE

## 2018-11-07 PROCEDURE — 90471 IMMUNIZATION ADMIN: CPT | Performed by: FAMILY MEDICINE

## 2018-11-07 PROCEDURE — 90662 IIV NO PRSV INCREASED AG IM: CPT | Performed by: FAMILY MEDICINE

## 2018-11-08 ENCOUNTER — TELEPHONE (OUTPATIENT)
Dept: FAMILY MEDICINE CLINIC | Age: 68
End: 2018-11-08

## 2018-11-08 NOTE — TELEPHONE ENCOUNTER
----- Message from [facility] Lab Results From Soft (Epic Adt) sent at 11/8/2018  3:33 AM EST -----  Regarding: Cancellation of Order # [id number]  Order number [id number] for the procedure MICROALBUMIN /   [address] [facility] [address] [facility] has been canceled by [facility]   Incoming Lab Results From Soft ([address]) [[id number]]. This   procedure was ordered by Donavan Eid MD [[id number]] on Nov 7, 2018 for the patient Malissa Petersen [[id number]]. The reason for   cancellation was \"Other\".

## 2018-11-09 DIAGNOSIS — E78.5 HYPERLIPIDEMIA: ICD-10-CM

## 2018-11-09 RX ORDER — CHLORTHALIDONE 25 MG/1
TABLET ORAL
Qty: 90 TABLET | Refills: 0 | Status: SHIPPED | OUTPATIENT
Start: 2018-11-09 | End: 2019-02-07 | Stop reason: SDUPTHER

## 2018-11-10 RX ORDER — GEMFIBROZIL 600 MG/1
TABLET, FILM COATED ORAL
Qty: 180 TABLET | Refills: 1 | Status: SHIPPED | OUTPATIENT
Start: 2018-11-10 | End: 2019-03-15

## 2018-11-12 ENCOUNTER — OFFICE VISIT (OUTPATIENT)
Dept: DERMATOLOGY | Age: 68
End: 2018-11-12
Payer: COMMERCIAL

## 2018-11-12 DIAGNOSIS — L57.0 ACTINIC KERATOSES: Primary | ICD-10-CM

## 2018-11-12 PROCEDURE — 17000 DESTRUCT PREMALG LESION: CPT | Performed by: DERMATOLOGY

## 2018-11-12 PROCEDURE — 17003 DESTRUCT PREMALG LES 2-14: CPT | Performed by: DERMATOLOGY

## 2018-11-12 NOTE — PROGRESS NOTES
[facility]) Dermatology  Cecilia Mcneal MD  [phone]      Malissa Petersen  1950    79 y.o. male     Date of Visit: 11/12/2018    Chief Complaint / Reason for Referral: Lesion     I was asked to see this patient by Dr. Bella ref. provider found. History of Present Illness:  1. Over the past few months, has noticed an increasing number of rough lesions on forehead and scalp   -Denies personal/family history of skin cancer, atypical nevi, or melanoma.    -Uses caps when riding motorcycles and convertible. Does not use sunscreen. Review of Systems:  Constitutional: Reports general sense of well-being. Heme: Increased tendency to bleed due to ASA    Past Medical History, Surgical History, Family History, Medications and Allergies reviewed.     Past Medical History:   Diagnosis Date    CAD (coronary artery disease)     Heart attack ([facility] [address] 3/2006    Hyperlipidemia     Hypertension     PONV (postoperative nausea and vomiting)     Type II or unspecified type diabetes mellitus without mention of complication, not stated as uncontrolled      Past Surgical History:   Procedure Laterality Date    CARDIAC SURGERY  2006    PTCA, stent LAD, Dr Park Bones  2008    right rotator cuff tear, [place]    SHOULDER SURGERY Left 1/2015    TONSILLECTOMY  1979       No Known Allergies  Outpatient Prescriptions Marked as Taking for the 11/12/18 encounter (Office Visit) with Cecilia Mcneal MD   Medication Sig Dispense Refill    gemfibrozil (LOPID) 600 MG tablet TAKE 1 TABLET BY MOUTH TWO TIMES A  tablet 1    chlorthalidone (HYGROTON) 25 MG tablet TAKE ONE TABLET BY MOUTH ONE TIME A DAY 90 tablet 0    insulin regular human (HUMULIN R U-500 KWIKPEN) 500 UNIT/ML SOPN concentrated injection pen Inject 60-80 Units into the skin 2 times daily (with meals) INJECT 60 UNITS WITH BREAKFAST AND 80 UNITS WITH DINNER DX CODE E 11.40 30 mL 2    empagliflozin (JARDIANCE) 10 MG

## 2018-11-14 ENCOUNTER — PATIENT MESSAGE (OUTPATIENT)
Dept: FAMILY MEDICINE CLINIC | Age: 68
End: 2018-11-14

## 2018-11-16 ENCOUNTER — TELEPHONE (OUTPATIENT)
Dept: FAMILY MEDICINE CLINIC | Age: 68
End: 2018-11-16

## 2018-12-03 ENCOUNTER — OFFICE VISIT (OUTPATIENT)
Dept: CARDIOLOGY CLINIC | Age: 68
End: 2018-12-03
Payer: COMMERCIAL

## 2018-12-03 VITALS
WEIGHT: 217.8 LBS | OXYGEN SATURATION: 97 % | HEIGHT: 69 IN | HEART RATE: 74 BPM | SYSTOLIC BLOOD PRESSURE: 116 MMHG | DIASTOLIC BLOOD PRESSURE: 72 MMHG | BODY MASS INDEX: 32.26 KG/M2

## 2018-12-03 DIAGNOSIS — E78.2 MIXED HYPERLIPIDEMIA: Chronic | ICD-10-CM

## 2018-12-03 DIAGNOSIS — I25.10 CORONARY ARTERY DISEASE INVOLVING NATIVE CORONARY ARTERY OF NATIVE HEART WITHOUT ANGINA PECTORIS: Primary | ICD-10-CM

## 2018-12-03 PROCEDURE — 3045F PR MOST RECENT HEMOGLOBIN A1C LEVEL 7.0-9.0%: CPT | Performed by: INTERNAL MEDICINE

## 2018-12-03 PROCEDURE — G8417 CALC BMI ABV UP PARAM F/U: HCPCS | Performed by: INTERNAL MEDICINE

## 2018-12-03 PROCEDURE — 1101F PT FALLS ASSESS-DOCD LE1/YR: CPT | Performed by: INTERNAL MEDICINE

## 2018-12-03 PROCEDURE — G8598 ASA/ANTIPLAT THER USED: HCPCS | Performed by: INTERNAL MEDICINE

## 2018-12-03 PROCEDURE — 99214 OFFICE O/P EST MOD 30 MIN: CPT | Performed by: INTERNAL MEDICINE

## 2018-12-03 PROCEDURE — 4040F PNEUMOC VAC/ADMIN/RCVD: CPT | Performed by: INTERNAL MEDICINE

## 2018-12-03 PROCEDURE — G8482 FLU IMMUNIZE ORDER/ADMIN: HCPCS | Performed by: INTERNAL MEDICINE

## 2018-12-03 PROCEDURE — G8427 DOCREV CUR MEDS BY ELIG CLIN: HCPCS | Performed by: INTERNAL MEDICINE

## 2018-12-03 PROCEDURE — 1123F ACP DISCUSS/DSCN MKR DOCD: CPT | Performed by: INTERNAL MEDICINE

## 2018-12-03 PROCEDURE — 3017F COLORECTAL CA SCREEN DOC REV: CPT | Performed by: INTERNAL MEDICINE

## 2018-12-03 PROCEDURE — 2022F DILAT RTA XM EVC RTNOPTHY: CPT | Performed by: INTERNAL MEDICINE

## 2018-12-03 PROCEDURE — 1036F TOBACCO NON-USER: CPT | Performed by: INTERNAL MEDICINE

## 2018-12-03 RX ORDER — CARVEDILOL 25 MG/1
TABLET ORAL
Qty: 180 TABLET | Refills: 4 | Status: SHIPPED | OUTPATIENT
Start: 2018-12-03 | End: 2019-11-25 | Stop reason: SDUPTHER

## 2018-12-03 RX ORDER — ATORVASTATIN CALCIUM 80 MG/1
TABLET, FILM COATED ORAL
Qty: 90 TABLET | Refills: 3 | Status: SHIPPED | OUTPATIENT
Start: 2018-12-03 | End: 2019-11-25 | Stop reason: SDUPTHER

## 2018-12-03 NOTE — PROGRESS NOTES
atrium is normal in size. The intraatrial  septum is not well seen, subcostal views are technically suboptimal.  The mitral anulus is calcified with structurally normal mitral  leaflets and trivial mitral regurgitation. The aortic valve is  structurally normal. The tricuspid valve is structurally. The  pulmonic valve is structurally normal. There is no significant  pericardial effusion. The aortic root is borderline dilated. The  vena cava is not well seen. IMPRESSION-   1. Hypertrophied left ventricle with normal global systolic  function, with wall motion abnormalities as above. 2. Stage I diastolic dysfunction of the left ventricle. 3. Mild left atrial enlargement. 4. Mitral anular calcification. 5. Borderline dilated aortic root. Assessment:  HTN:  controlled  CAD:   no angina or heart failure. No arrhythmia by history or exam.  HLD:  Optimal control Lipids  DM:  Stable followed by PCP    Plan:  1. Continue current medication regimen, reviewed with patient  2. Healthy lifestyle education reviewed including nutrition, exercise and activity  3. Follow up with me as scheduled in a year  4. Labs- Lipids, CBC, CMP, A1C,  (FASTING)  QUALITY MEASURES  1. Tobacco Cessation Counseling: NA  2. Retake of BP if >140/90:   NA  3. Documentation to PCP/referring for new patient:  Sent to PCP at close of office visit  4. CAD patient on anti-platelet: Yes  5. CAD patient on STATIN therapy:  Yes  6.  Patient with CHF and aFib on anticoagulation:  MESSI Olson MD 12/3/2018 7:45 AM

## 2018-12-07 RX ORDER — LISINOPRIL 40 MG/1
TABLET ORAL
Qty: 90 TABLET | Refills: 3 | Status: SHIPPED | OUTPATIENT
Start: 2018-12-07 | End: 2019-11-25 | Stop reason: SDUPTHER

## 2018-12-20 RX ORDER — BLOOD-GLUCOSE CONTROL, LOW
1 EACH MISCELLANEOUS
Qty: 400 EACH | Refills: 3 | Status: SHIPPED | OUTPATIENT
Start: 2018-12-20

## 2018-12-20 RX ORDER — BLOOD-GLUCOSE METER
EACH MISCELLANEOUS
Qty: 1 KIT | Refills: 0 | Status: SHIPPED | OUTPATIENT
Start: 2018-12-20

## 2018-12-21 NOTE — TELEPHONE ENCOUNTER
Recommendations accepted - removed agamatrix and old pen needle from Epic med list.     Gloria ColladoD  [facility] Pharmacist  [phone] or [phone] (Option 7)
#: 2 New Medication Order Reason(s): Cost Conversion, Patient Preference  Total Interventions Accepted: 2  Time Spent (min): 10

## 2019-01-04 ENCOUNTER — OFFICE VISIT (OUTPATIENT)
Dept: ENDOCRINOLOGY | Age: 69
End: 2019-01-04
Payer: COMMERCIAL

## 2019-01-04 VITALS
BODY MASS INDEX: 32.11 KG/M2 | HEIGHT: 69 IN | WEIGHT: 216.8 LBS | RESPIRATION RATE: 12 BRPM | SYSTOLIC BLOOD PRESSURE: 107 MMHG | DIASTOLIC BLOOD PRESSURE: 68 MMHG | OXYGEN SATURATION: 94 % | HEART RATE: 67 BPM

## 2019-01-04 DIAGNOSIS — I10 ESSENTIAL HYPERTENSION: Chronic | ICD-10-CM

## 2019-01-04 DIAGNOSIS — E78.2 MIXED HYPERLIPIDEMIA: Chronic | ICD-10-CM

## 2019-01-04 LAB — HBA1C MFR BLD: 6.5 %

## 2019-01-04 PROCEDURE — 1036F TOBACCO NON-USER: CPT | Performed by: INTERNAL MEDICINE

## 2019-01-04 PROCEDURE — 83036 HEMOGLOBIN GLYCOSYLATED A1C: CPT | Performed by: INTERNAL MEDICINE

## 2019-01-04 PROCEDURE — G8598 ASA/ANTIPLAT THER USED: HCPCS | Performed by: INTERNAL MEDICINE

## 2019-01-04 PROCEDURE — 3044F HG A1C LEVEL LT 7.0%: CPT | Performed by: INTERNAL MEDICINE

## 2019-01-04 PROCEDURE — G8417 CALC BMI ABV UP PARAM F/U: HCPCS | Performed by: INTERNAL MEDICINE

## 2019-01-04 PROCEDURE — 2022F DILAT RTA XM EVC RTNOPTHY: CPT | Performed by: INTERNAL MEDICINE

## 2019-01-04 PROCEDURE — 1123F ACP DISCUSS/DSCN MKR DOCD: CPT | Performed by: INTERNAL MEDICINE

## 2019-01-04 PROCEDURE — 99214 OFFICE O/P EST MOD 30 MIN: CPT | Performed by: INTERNAL MEDICINE

## 2019-01-04 PROCEDURE — 3017F COLORECTAL CA SCREEN DOC REV: CPT | Performed by: INTERNAL MEDICINE

## 2019-01-04 PROCEDURE — 4040F PNEUMOC VAC/ADMIN/RCVD: CPT | Performed by: INTERNAL MEDICINE

## 2019-01-04 PROCEDURE — G8482 FLU IMMUNIZE ORDER/ADMIN: HCPCS | Performed by: INTERNAL MEDICINE

## 2019-01-04 PROCEDURE — 1101F PT FALLS ASSESS-DOCD LE1/YR: CPT | Performed by: INTERNAL MEDICINE

## 2019-01-04 PROCEDURE — G8427 DOCREV CUR MEDS BY ELIG CLIN: HCPCS | Performed by: INTERNAL MEDICINE

## 2019-02-07 RX ORDER — CHLORTHALIDONE 25 MG/1
TABLET ORAL
Qty: 90 TABLET | Refills: 3 | Status: SHIPPED | OUTPATIENT
Start: 2019-02-07 | End: 2020-03-19

## 2019-03-06 ENCOUNTER — HOSPITAL ENCOUNTER (OUTPATIENT)
Age: 69
Discharge: HOME | End: 2019-03-06
Payer: COMMERCIAL

## 2019-03-06 DIAGNOSIS — I10 ESSENTIAL HYPERTENSION: Chronic | ICD-10-CM

## 2019-03-06 DIAGNOSIS — I25.10 CORONARY ARTERY DISEASE INVOLVING NATIVE CORONARY ARTERY OF NATIVE HEART WITHOUT ANGINA PECTORIS: ICD-10-CM

## 2019-03-06 DIAGNOSIS — E78.2 MIXED HYPERLIPIDEMIA: Chronic | ICD-10-CM

## 2019-03-06 LAB
A/G RATIO: 2 (ref 1.1–2.2)
ALBUMIN SERPL-MCNC: 4.9 G/DL (ref 3.4–5)
ALP BLD-CCNC: 46 U/L (ref 40–129)
ALT SERPL-CCNC: 25 U/L (ref 10–40)
ANION GAP SERPL CALCULATED.3IONS-SCNC: 15 MMOL/L (ref 3–16)
AST SERPL-CCNC: 20 U/L (ref 15–37)
BASOPHILS ABSOLUTE: 0.1 K/UL (ref 0–0.2)
BASOPHILS RELATIVE PERCENT: 1.1 %
BILIRUB SERPL-MCNC: 0.5 MG/DL (ref 0–1)
BUN BLDV-MCNC: 26 MG/DL (ref 7–20)
CALCIUM SERPL-MCNC: 9.6 MG/DL (ref 8.3–10.6)
CHLORIDE BLD-SCNC: 100 MMOL/L (ref 99–110)
CHOLESTEROL, TOTAL: 128 MG/DL (ref 0–199)
CO2: 25 MMOL/L (ref 21–32)
CREAT SERPL-MCNC: 1 MG/DL (ref 0.8–1.3)
CREATININE URINE: 67.8 MG/DL (ref 39–259)
EOSINOPHILS ABSOLUTE: 0.4 K/UL (ref 0–0.6)
EOSINOPHILS RELATIVE PERCENT: 6.9 %
GFR AFRICAN AMERICAN: >60
GFR NON-AFRICAN AMERICAN: >60
GLOBULIN: 2.5 G/DL
GLUCOSE BLD-MCNC: 164 MG/DL (ref 70–99)
HCT VFR BLD CALC: 43.3 % (ref 40.5–52.5)
HDLC SERPL-MCNC: 31 MG/DL (ref 40–60)
HEMOGLOBIN: 14.5 G/DL (ref 13.5–17.5)
LDL CHOLESTEROL CALCULATED: 66 MG/DL
LYMPHOCYTES ABSOLUTE: 1.7 K/UL (ref 1–5.1)
LYMPHOCYTES RELATIVE PERCENT: 28.6 %
MCH RBC QN AUTO: 31 PG (ref 26–34)
MCHC RBC AUTO-ENTMCNC: 33.5 G/DL (ref 31–36)
MCV RBC AUTO: 92.4 FL (ref 80–100)
MICROALBUMIN UR-MCNC: 2.6 MG/DL
MICROALBUMIN/CREAT UR-RTO: 38.3 MG/G (ref 0–30)
MONOCYTES ABSOLUTE: 0.4 K/UL (ref 0–1.3)
MONOCYTES RELATIVE PERCENT: 7.2 %
NEUTROPHILS ABSOLUTE: 3.4 K/UL (ref 1.7–7.7)
NEUTROPHILS RELATIVE PERCENT: 56.2 %
PDW BLD-RTO: 13.8 % (ref 12.4–15.4)
PLATELET # BLD: 197 K/UL (ref 135–450)
PMV BLD AUTO: 9.2 FL (ref 5–10.5)
POTASSIUM SERPL-SCNC: 3.6 MMOL/L (ref 3.5–5.1)
RBC # BLD: 4.69 M/UL (ref 4.2–5.9)
SODIUM BLD-SCNC: 140 MMOL/L (ref 136–145)
TOTAL PROTEIN: 7.4 G/DL (ref 6.4–8.2)
TRIGL SERPL-MCNC: 156 MG/DL (ref 0–150)
TSH SERPL DL<=0.05 MIU/L-ACNC: 2.31 UIU/ML (ref 0.27–4.2)
VLDLC SERPL CALC-MCNC: 31 MG/DL
WBC # BLD: 6.1 K/UL (ref 4–11)

## 2019-03-06 PROCEDURE — 82570 ASSAY OF URINE CREATININE: CPT

## 2019-03-06 PROCEDURE — 83036 HEMOGLOBIN GLYCOSYLATED A1C: CPT

## 2019-03-06 PROCEDURE — 80061 LIPID PANEL: CPT

## 2019-03-06 PROCEDURE — 85025 COMPLETE CBC W/AUTO DIFF WBC: CPT

## 2019-03-06 PROCEDURE — 82043 UR ALBUMIN QUANTITATIVE: CPT

## 2019-03-06 PROCEDURE — 36415 COLL VENOUS BLD VENIPUNCTURE: CPT

## 2019-03-06 PROCEDURE — 84443 ASSAY THYROID STIM HORMONE: CPT

## 2019-03-06 PROCEDURE — 80053 COMPREHEN METABOLIC PANEL: CPT

## 2019-03-07 ENCOUNTER — TELEPHONE (OUTPATIENT)
Dept: CARDIOLOGY CLINIC | Age: 69
End: 2019-03-07

## 2019-03-07 LAB
ESTIMATED AVERAGE GLUCOSE: 159.9 MG/DL
HBA1C MFR BLD: 7.2 %

## 2019-03-11 ENCOUNTER — TELEPHONE (OUTPATIENT)
Dept: PHARMACY | Facility: CLINIC | Age: 69
End: 2019-03-11

## 2019-03-15 ENCOUNTER — TELEPHONE (OUTPATIENT)
Dept: PHARMACY | Facility: CLINIC | Age: 69
End: 2019-03-15

## 2019-03-15 ENCOUNTER — SCHEDULED TELEPHONE ENCOUNTER (OUTPATIENT)
Dept: PHARMACY | Facility: CLINIC | Age: 69
End: 2019-03-15

## 2019-03-25 ENCOUNTER — TELEPHONE (OUTPATIENT)
Dept: DERMATOLOGY | Age: 69
End: 2019-03-25

## 2019-04-26 ENCOUNTER — OFFICE VISIT (OUTPATIENT)
Dept: DERMATOLOGY | Age: 69
End: 2019-04-26
Payer: COMMERCIAL

## 2019-04-26 DIAGNOSIS — L72.0 INFECTED EPIDERMOID CYST: Primary | ICD-10-CM

## 2019-04-26 DIAGNOSIS — L08.9 INFECTED EPIDERMOID CYST: Primary | ICD-10-CM

## 2019-04-26 PROCEDURE — 3017F COLORECTAL CA SCREEN DOC REV: CPT | Performed by: DERMATOLOGY

## 2019-04-26 PROCEDURE — 1123F ACP DISCUSS/DSCN MKR DOCD: CPT | Performed by: DERMATOLOGY

## 2019-04-26 PROCEDURE — 99214 OFFICE O/P EST MOD 30 MIN: CPT | Performed by: DERMATOLOGY

## 2019-04-26 PROCEDURE — G8427 DOCREV CUR MEDS BY ELIG CLIN: HCPCS | Performed by: DERMATOLOGY

## 2019-04-26 PROCEDURE — 10060 I&D ABSCESS SIMPLE/SINGLE: CPT | Performed by: DERMATOLOGY

## 2019-04-26 PROCEDURE — G8598 ASA/ANTIPLAT THER USED: HCPCS | Performed by: DERMATOLOGY

## 2019-04-26 PROCEDURE — G8417 CALC BMI ABV UP PARAM F/U: HCPCS | Performed by: DERMATOLOGY

## 2019-04-26 PROCEDURE — 1036F TOBACCO NON-USER: CPT | Performed by: DERMATOLOGY

## 2019-04-26 PROCEDURE — 4040F PNEUMOC VAC/ADMIN/RCVD: CPT | Performed by: DERMATOLOGY

## 2019-04-26 RX ORDER — CEPHALEXIN 500 MG/1
500 CAPSULE ORAL 3 TIMES DAILY
Qty: 30 CAPSULE | Refills: 0 | Status: SHIPPED | OUTPATIENT
Start: 2019-04-26 | End: 2019-05-06

## 2019-04-26 NOTE — PROGRESS NOTES
3    lisinopril (PRINIVIL;ZESTRIL) 40 MG tablet TAKE ONE TABLET BY MOUTH ONE TIME A DAY 90 tablet 3    atorvastatin (LIPITOR) 80 MG tablet TAKE 1 TABLET BY MOUTH ONCE DAILY 90 tablet 3    carvedilol (COREG) 25 MG tablet TAKE ONE TABLET BY MOUTH TWICE A DAY WITH MEALS 180 tablet 4    insulin regular human (HUMULIN R U-500 KWIKPEN) 500 UNIT/ML SOPN concentrated injection pen Inject 60-80 Units into the skin 2 times daily (with meals) INJECT 60 UNITS WITH BREAKFAST AND 80 UNITS WITH DINNER DX CODE E 11.40 30 mL 2    empagliflozin (JARDIANCE) 10 MG tablet Take 1 tablet by mouth daily 90 tablet 3    Omega-3 Fatty Acids (FISH OIL) 1000 MG CAPS Take 3,000 mg by mouth 2 times daily       aspirin 81 MG tablet Take 81 mg by mouth daily.  Multiple Vitamins-Minerals (MULTIVITAMIN PO) Take  by mouth daily. Physical Examination     The following were examined and determined to be normal: Psych/Neuro. The following were examined and determined to be abnormal: Neck. -General: Well-appearing, NAD  1. R neck - 3cm dome-shaped fluctuant erythematous nodule w/ punctum     Assessment and Plan     1. Infected inflamed epidermoid cyst, R neck   -Reassurance re: benignity   -Edu re: potential recurrent symptoms, intralesional steroid for inflammation, excision as only definitive treatment   -Informed verbal consent for incision and drainage obtained after risks (bleeding, infection, scar, discomfort)/benefits explained.   -Local anesthesia achieved with 1% lidocaine w/ epinephrine/sodium bicarbonate. Small incision performed overlying the cyst w/ 11 blade. Small amount of purulent material expressed.    -Keflex 500mg PO tid x 10d

## 2019-04-29 LAB
GRAM STAIN RESULT: NORMAL
WOUND/ABSCESS: NORMAL

## 2019-05-22 ENCOUNTER — OFFICE VISIT (OUTPATIENT)
Dept: ENDOCRINOLOGY | Age: 69
End: 2019-05-22
Payer: COMMERCIAL

## 2019-05-22 ENCOUNTER — TELEPHONE (OUTPATIENT)
Dept: DERMATOLOGY | Age: 69
End: 2019-05-22

## 2019-05-22 VITALS
WEIGHT: 211.4 LBS | DIASTOLIC BLOOD PRESSURE: 63 MMHG | SYSTOLIC BLOOD PRESSURE: 112 MMHG | BODY MASS INDEX: 31.22 KG/M2 | HEART RATE: 67 BPM

## 2019-05-22 DIAGNOSIS — I10 ESSENTIAL HYPERTENSION: ICD-10-CM

## 2019-05-22 DIAGNOSIS — L72.0 EPIDERMOID CYST OF NECK: Primary | ICD-10-CM

## 2019-05-22 DIAGNOSIS — E78.2 MIXED HYPERLIPIDEMIA: ICD-10-CM

## 2019-05-22 PROCEDURE — 3045F PR MOST RECENT HEMOGLOBIN A1C LEVEL 7.0-9.0%: CPT | Performed by: INTERNAL MEDICINE

## 2019-05-22 PROCEDURE — 2022F DILAT RTA XM EVC RTNOPTHY: CPT | Performed by: INTERNAL MEDICINE

## 2019-05-22 PROCEDURE — 1036F TOBACCO NON-USER: CPT | Performed by: INTERNAL MEDICINE

## 2019-05-22 PROCEDURE — 3017F COLORECTAL CA SCREEN DOC REV: CPT | Performed by: INTERNAL MEDICINE

## 2019-05-22 PROCEDURE — 99214 OFFICE O/P EST MOD 30 MIN: CPT | Performed by: INTERNAL MEDICINE

## 2019-05-22 PROCEDURE — G8417 CALC BMI ABV UP PARAM F/U: HCPCS | Performed by: INTERNAL MEDICINE

## 2019-05-22 PROCEDURE — 1123F ACP DISCUSS/DSCN MKR DOCD: CPT | Performed by: INTERNAL MEDICINE

## 2019-05-22 PROCEDURE — G8427 DOCREV CUR MEDS BY ELIG CLIN: HCPCS | Performed by: INTERNAL MEDICINE

## 2019-05-22 PROCEDURE — G8598 ASA/ANTIPLAT THER USED: HCPCS | Performed by: INTERNAL MEDICINE

## 2019-05-22 PROCEDURE — 4040F PNEUMOC VAC/ADMIN/RCVD: CPT | Performed by: INTERNAL MEDICINE

## 2019-05-22 NOTE — TELEPHONE ENCOUNTER
Patient came in today to see if he could schedule excision for cyst on neck. He has been treated with antibiotics and steroids but it is still inflamed. Can we please schedule an excision with one of our other dermatologists to remove this cyst.     Please call [phone] to schedule. He is aware he may have to go to [facility] vs. [place] and that is ok.

## 2019-05-22 NOTE — PROGRESS NOTES
Encounters:   19 211 lb 6.4 oz (95.9 kg)   19 216 lb 12.8 oz (98.3 kg)   18 217 lb 12.8 oz (98.8 kg)     BP Readings from Last 3 Encounters:   19 112/63   19 107/68   18 116/72        Past Medical History:   Diagnosis Date    CAD (coronary artery disease)     Heart attack ([facility] [address] 3/2006    Hyperlipidemia     Hypertension     PONV (postoperative nausea and vomiting)     Type II or unspecified type diabetes mellitus without mention of complication, not stated as uncontrolled      Past Surgical History:   Procedure Laterality Date    CARDIAC SURGERY  2006    PTCA, stent LAD, Dr Pj Ortiz      right rotator cuff tear, [place]    SHOULDER SURGERY Left 2015    TONSILLECTOMY  1979     Family History   Problem Relation Age of Onset    Cancer Mother         pancreas    Cancer Father         esophageal    Diabetes Sister     Diabetes Paternal Grandmother     Diabetes Paternal Grandfather      Social History     Tobacco Use   Smoking Status Former Smoker    Start date: 1967    Last attempt to quit: 1985    Years since quittin.4   Smokeless Tobacco Never Used      Social History     Substance and Sexual Activity   Alcohol Use Yes    Alcohol/week: 0.0 oz    Comment: 1 drink per week       HPI      Caio Amato is a 81 y.o. male who is here for a follow-up for management of Type 2 DM. PCP  Cherie Conway MD   Cardiologist : Dr. Man Li    Diagnosed with Diabetes Mellitus type 2 [id number] yrs back. Microvascular complications: Has diabetic  Retinopathy s/p laser . (Last eye exam: . 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 Follows with [facility] every 3-6 months )  No known nephropathy . Has Peripheral neuropathy with numbness in both feet. Has h/o CAD. S/p MI and stent in . Home regimen: On humulin R U-500 ( since ).    Current dose is  60-80 units BID ( decides based on sugars and how much he is eating)  Continue jardiance 10 mg daily. Previous medications: Metformin. Levemir insulin 65  units QHS. Novolog 40 units TID with meals + SSI    BS pattern. Checks BS 2-3 times a day  -180    Hypoglycemia. 1-2 times a month. fasting    Diet: Eats 3 meals/day at regular times . Had nutrition education in 2005. Exercise: Walks 30-45 mins 2 days/week. No nausea, vomiting  No polyuria , polydipsia. Hyperlipidemia: takes Lipitor 80 mg daily, lopid 600 mg BID and fish oil . Taking all meds regulalrly. No muscle aches or pains. Hypertension: Lisinopril 40 mg , chlorthalidone 25 mg daily and coreg 25 mg BID. Takes BP regularly. No dizziness, headache  No chest pain, shortness of breath. Review of Systems   Constitutional: Positive for malaise/fatigue. Negative for weight loss. HENT: Negative for sore throat. Eyes: Negative for blurred vision. Respiratory: Negative for cough and shortness of breath. Cardiovascular: Negative for chest pain and palpitations. Gastrointestinal: Negative for heartburn, nausea, vomiting and abdominal pain. Genitourinary: Negative for urgency and frequency. Musculoskeletal: Positive for joint pain. Negative for myalgias and back pain. Skin: Negative for rash. Neurological: Positive for tingling. Negative for sensory change and headaches. Endo/Heme/Allergies: Negative for polydipsia. Psychiatric/Behavioral: Negative for depression. The patient is not nervous/anxious. Physical Exam   Constitutional: He is oriented to person, place, and time. He appears well-developed and well-nourished. No distress. HENT:   Head: Normocephalic. Mouth/Throat: Oropharynx is clear and moist.   Eyes: EOM are normal. Right eye exhibits no discharge. Neck: No thyromegaly present. Cardiovascular: Normal rate and normal heart sounds. Pulmonary/Chest: Effort normal and breath sounds normal. No respiratory distress.  He has no wheezes. Abdominal: Soft. Bowel sounds are normal. He exhibits no distension. There is no tenderness. Musculoskeletal: He exhibits no edema or tenderness. Neurological: He is alert and oriented to person, place, and time. Skin: Skin is warm and dry. No rash noted. He is not diaphoretic. Psychiatric: His behavior is normal. Thought content normal.                  Office Visit on 04/26/2019   Component Date Value Ref Range Status    WOUND/ABSCESS 04/26/2019 No growth 60-72 hours   Final    Gram Stain Result 04/26/2019    Final                    Value:1+ WBC's (Polymorphonuclear)  No organisms seen           Assessment/Plan    1. Type 2 DM    This 81 y.o. male has Type 2 DM with obesity and insulin resistance. DM is complicated by retinopathy. A1c 10.8 %--->8.8 %--- > 8.1 %--->6.6 %.--> 6.8 %---> 6.8 % ---> 6.5 %---> 6.7 % --->7.9 % ---> 6.5 %--->6.6 % ---> 6.8 % --> 6.8 % ---> 6.6 % ---> 7.3 % ---> 6.5 % ---> 7.1 %       -BS variable    -Change the dose to 60 units in A.M , 40 units before lunch and dinner. Eat snack at bedtime  -Continue jardiance 10 mg daily. Continue follow-up with the ophthalmologist for retinopathy every 6 months. Urine microalbumin/cr slightly high in 03/16,  04/17., 11/8 , 03/19 On lisinopril. Has peripheral neuropathy on exam. Discussed foot care. Pt on ASA. Former Smoker. 2. Hypertension. BP at goal today. 3. Hyperlipidemia. On fibrates, statins and fish oil. TGD have improved significantly with weight loss and treatment. Triglycerides 824---->230--->211---> 111---> 255---> 211---> 197---> 96-----> 76---> 156                                LDL 57---> 59---> 71---> 66                               HDL 26--> 30---> 34--> 31    4. CAD s/p MI. On ASA/Plavix. Follows with cards.

## 2019-05-23 NOTE — TELEPHONE ENCOUNTER
Location-rt neck. Patient states it has gotten bigger but is not painful. Not sure if another provider may want to excise, evaluate, or refer for excision.     Will refer to Lakeisha Sarmiento for evaluation/consult for possible excision

## 2019-05-29 ENCOUNTER — OFFICE VISIT (OUTPATIENT)
Dept: FAMILY MEDICINE CLINIC | Age: 69
End: 2019-05-29
Payer: COMMERCIAL

## 2019-05-29 VITALS
OXYGEN SATURATION: 97 % | HEART RATE: 65 BPM | HEIGHT: 70 IN | BODY MASS INDEX: 30.43 KG/M2 | RESPIRATION RATE: 16 BRPM | DIASTOLIC BLOOD PRESSURE: 80 MMHG | WEIGHT: 212.6 LBS | SYSTOLIC BLOOD PRESSURE: 122 MMHG

## 2019-05-29 DIAGNOSIS — I10 ESSENTIAL HYPERTENSION: ICD-10-CM

## 2019-05-29 DIAGNOSIS — I25.10 CORONARY ARTERY DISEASE INVOLVING NATIVE CORONARY ARTERY OF NATIVE HEART WITHOUT ANGINA PECTORIS: ICD-10-CM

## 2019-05-29 DIAGNOSIS — E11.40 TYPE 2 DIABETES, CONTROLLED, WITH NEUROPATHY (HCC): ICD-10-CM

## 2019-05-29 DIAGNOSIS — Z00.00 ANNUAL PHYSICAL EXAM: Primary | ICD-10-CM

## 2019-05-29 DIAGNOSIS — E78.2 MIXED HYPERLIPIDEMIA: ICD-10-CM

## 2019-05-29 DIAGNOSIS — Z12.11 COLON CANCER SCREENING: ICD-10-CM

## 2019-05-29 PROCEDURE — 99397 PER PM REEVAL EST PAT 65+ YR: CPT | Performed by: FAMILY MEDICINE

## 2019-05-29 ASSESSMENT — PATIENT HEALTH QUESTIONNAIRE - PHQ9
1. LITTLE INTEREST OR PLEASURE IN DOING THINGS: 0
2. FEELING DOWN, DEPRESSED OR HOPELESS: 0
SUM OF ALL RESPONSES TO PHQ QUESTIONS 1-9: 0
SUM OF ALL RESPONSES TO PHQ QUESTIONS 1-9: 0
SUM OF ALL RESPONSES TO PHQ9 QUESTIONS 1 & 2: 0

## 2019-05-29 ASSESSMENT — ENCOUNTER SYMPTOMS
ABDOMINAL DISTENTION: 0
EYES NEGATIVE: 1
BLOOD IN STOOL: 0
VOMITING: 0
ABDOMINAL PAIN: 0
CONSTIPATION: 0
CHEST TIGHTNESS: 0
WHEEZING: 0
NAUSEA: 0
COUGH: 0
COLOR CHANGE: 0
SHORTNESS OF BREATH: 0
DIARRHEA: 0

## 2019-05-29 NOTE — PROGRESS NOTES
Patient: Melia Lee is a 81 y.o.male who presents today with the following Chief Complaint(s):  Chief Complaint   Patient presents with    Annual Exam         HPI: In 2/2019, R eye vision was distorted/foggy. Pt understood that he had retinal artery aneurysm, s/p repair and cautery. Vision has returned to nl. Pt will be allowed to resume working out in few days. Has nocturia once per noc, easily falls back asleep. To have sebaceous cyst removed R lat neck in 1 wk by Dr Phyllis Bailey office. Cont's annual visits with Dr Dakota Flores since [place] 2006. No cp. Dr Brigitte Benson has helped pt to control DM2. Feet neuropathy is much improved.     Lab Results   Component Value Date    LABA1C 7.2 03/06/2019    LABA1C 6.5 01/04/2019    LABA1C 7.3 08/31/2018     :        Current Outpatient Medications   Medication Sig Dispense Refill    insulin regular human (HUMULIN R U-500 KWIKPEN) 500 UNIT/ML SOPN concentrated injection pen Inject 60-80 Units into the skin 2 times daily (with meals) INJECT 60 UNITS WITH BREAKFAST AND 80 UNITS WITH DINNER DX CODE E 11.40 30 mL 2    chlorthalidone (HYGROTON) 25 MG tablet TAKE 1 TABLET BY MOUTH ONE TIME A DAY 90 tablet 3    Blood Glucose Monitoring Suppl (PRODIGY AUTOCODE BLOOD GLUCOSE) w/Device KIT Use to test blood glucose 4 times per day 1 kit 0    PRODIGY LANCETS 28G MISC 1 each by Does not apply route 4 times daily (after meals and at bedtime) 400 each 3    blood glucose test strips (PRODIGY NO CODING BLOOD GLUC) strip 1 each by In Vitro route 4 times daily (after meals and at bedtime) 400 each 3    Insulin Pen Needle 31G X 8 MM MISC 1 each by Does not apply route 3 times daily 300 each 3    lisinopril (PRINIVIL;ZESTRIL) 40 MG tablet TAKE ONE TABLET BY MOUTH ONE TIME A DAY 90 tablet 3    atorvastatin (LIPITOR) 80 MG tablet TAKE 1 TABLET BY MOUTH ONCE DAILY 90 tablet 3    carvedilol (COREG) 25 MG tablet TAKE ONE TABLET BY MOUTH TWICE A DAY WITH MEALS 180 tablet 4    empagliflozin (JARDIANCE) 10 MG tablet Take 1 tablet by mouth daily 90 tablet 3    Omega-3 Fatty Acids (FISH OIL) 1000 MG CAPS Take 3,000 mg by mouth 2 times daily       aspirin 81 MG tablet Take 81 mg by mouth daily.  Multiple Vitamins-Minerals (MULTIVITAMIN PO) Take  by mouth daily. No current facility-administered medications for this visit. Patient's past medical history,surgical history, family history, medications,  and allergies  were all reviewed and updated as appropriate today. Review of Systems   Constitutional: Negative for activity change, appetite change, chills, fatigue, fever and unexpected weight change. HENT: Negative. Eyes: Negative. Respiratory: Negative for cough, chest tightness, shortness of breath and wheezing. Cardiovascular: Negative for chest pain, palpitations and leg swelling. Gastrointestinal: Negative for abdominal distention, abdominal pain, blood in stool, constipation, diarrhea, nausea and vomiting. Genitourinary: Negative for difficulty urinating and dysuria. Musculoskeletal: Negative for arthralgias. Skin: Negative for color change, pallor and rash. Neurological: Negative for dizziness, syncope, weakness, light-headedness and headaches. Hematological: Negative. Psychiatric/Behavioral: Negative for dysphoric mood and sleep disturbance. The patient is not nervous/anxious. Physical Exam   Constitutional: He is oriented to person, place, and time. He appears well-developed and well-nourished. HENT:   Head: Normocephalic and atraumatic. Right Ear: External ear normal.   Left Ear: External ear normal.   Mouth/Throat: Oropharynx is clear and moist. No oropharyngeal exudate. Nl R and L ear canal and TM   Eyes: Pupils are equal, round, and reactive to light. Conjunctivae and EOM are normal. No scleral icterus. Neck: Normal range of motion. Neck supple.    R lat neck with 2 cm seb cyst   Cardiovascular: Normal rate, regular rhythm, normal heart sounds and intact distal pulses. Exam reveals no gallop and no friction rub. No murmur heard. Pulmonary/Chest: Effort normal and breath sounds normal. He has no rales. He exhibits no tenderness. Abdominal: Soft. Bowel sounds are normal. He exhibits no distension and no mass. There is no tenderness. There is no rebound and no guarding. Musculoskeletal: Normal range of motion. He exhibits no edema. Lymphadenopathy:     He has no cervical adenopathy. Neurological: He is alert and oriented to person, place, and time. He has normal reflexes. No cranial nerve deficit. Skin: Skin is warm and dry. No rash noted. No pallor. Psychiatric: He has a normal mood and affect. His behavior is normal. Judgment and thought content normal.         /80   Pulse 65   Resp 16   Ht 5' 10\" (1.778 m)   Wt 212 lb 9.6 oz (96.4 kg)   SpO2 97%   BMI 30.50 kg/m²     Assessment/Plan:    Daralene Heimlich was seen today for annual exam.    Diagnoses and all orders for this visit:    Annual physical exam   To resume exercise in few days (on hold 2/2 eye hemorrhage). Type 2 diabetes, controlled, with neuropathy [facility])   Per Dr Zhou Arambula    Coronary artery disease involving native coronary artery of native heart without angina pectoris   Cont with Dr Anabella Daily q yr.     Mixed hyperlipidemia   Controlled per 3/2019 labs    Essential hypertension   Controlled    Colon cancer screening  -     Fecal Blood Immunochemical Test; Future

## 2019-06-04 ENCOUNTER — PROCEDURE VISIT (OUTPATIENT)
Dept: SURGERY | Age: 69
End: 2019-06-04
Payer: COMMERCIAL

## 2019-06-04 VITALS
OXYGEN SATURATION: 96 % | HEART RATE: 73 BPM | SYSTOLIC BLOOD PRESSURE: 159 MMHG | TEMPERATURE: 97.3 F | DIASTOLIC BLOOD PRESSURE: 78 MMHG

## 2019-06-04 DIAGNOSIS — L72.0 EPIDERMAL CYST OF NECK: Primary | ICD-10-CM

## 2019-06-04 PROCEDURE — 12041 INTMD RPR N-HF/GENIT 2.5CM/<: CPT | Performed by: DERMATOLOGY

## 2019-06-04 PROCEDURE — 11423 EXC H-F-NK-SP B9+MARG 2.1-3: CPT | Performed by: DERMATOLOGY

## 2019-06-04 NOTE — PATIENT INSTRUCTIONS
[facility] Office Hours:    Monday-Thursday  7:30 AM-4:30 PM    Friday  9:00 AM-3:00 PM          POST-OPERATIVE CARE FOR STICHES              Bandage change after 48 hours    CARING FOR YOUR SURGICAL SITE  The bandage should remain on and completley dry for 48 hours. Do NOT get the bandage wet. 1. After the first 48 hours, gently remove the remaining part of the bandage. It can be helpful to moisten the bandage edges in the shower. Steri strips may still be on the wound. It is ok, they will fall off slowly with the daily bandage changes. 2. Gently clean the wound daily with mild soap and water. Try to clean off crust and debris. 3. Dry (pat) the area with a clean Q-tip or gauze. 4. Apply a layer of Vaseline/ Aquaphor (or Bacitracin if your doctor recommends) to the wound area only. 5. Cut a piece of Telfa (or any non-stick dressing) to fit just over the wound and secure it with paper tape. If the wound is small you may use a Band- Aid. Keep area covered for a total of 1 week(s). If the dressing comes off or if you have questions, or concerns about the dressing, please call the office for instructions! POST OPERATIVE INSTRUCTIONS    1. Activity: Do not lift anything heavier than a gallon of milk for 1 week. Also, avoid strenuous activity such as running, power walking or contact sports. 2. Eating and drinking: Do not drink alcohol for 48 hours after your procedure. Alcohol increases the chances of bleeding. 3. Medicines   -If you have discomfort, take Acetaminophen (Tylenol or Extra Strength Tylenol). Follow the instructions and warning on the bottle. -If your doctor has prescribed you an Aspirin daily, please keep taking it. Do not take extra Aspirin or medicines containing Aspirin (such as [place] and Excedrin) for 48 hours  after your procedure. Bleeding: If bleeding occurs, DO NOT remove the bandage.  Put firm pressure on the area with gauze for 20 minutes without peeking. If the bleeding continue, apply pressure for another 20 minutes. If the bleeding does not stop after you apply pressure, call us right away. If you can not call, go to the nearest emergency room or urgent care facility. What to expect:  You may have these symptoms. They are normal and should get better with time:  1. Swelling. Swelling usually increases for the first 48 hours after your procedure and then begins to improve. Some soreness and redness around your wound. If we worked close to you eyes  (forehead, nose, temple, or upper cheeks) your eyes may become swollen and/ or black and blue. 2. Bruising, which could last 1 week or more. 3. Pink and bumpy appearance to the scar. This may happen a few weeks after your procedure. After 4 weeks, you may gently massage the area each day with facial moisturizer or petroleum jelly (Vaseline or Aquaphor). This will help to smooth the skin and improve the appearance of the scar. The color of your scar will fade over time, but may be pink for several months after the procedure. The scar may take 6 months to 1 year to reach its final color and appearance. 4. \"Spitting\" suture. Occasionally, an inside suture (stitch) does not completely dissolve. When this happens, (generally 4-8 weeks after surgery), it causes a bump or \"pimple\" to form on the scar. This is easily removed and is not at all serious. It does not mean the skin cancer has returned. Contact us if it happens, but do not be alarmed. Vitamin E oil is NOT necessary. A good moisturizer is just as effective. Sunscreen IS necessary.  Use at least and SPF 30 sunscreen daily- even in winter    Call us at [phone] right away if you have any of the following symptoms:  -Bleeding that you can not stop (see highlighted area above)   -Pain that lasts longer than 48 hours  -Your wound becomes  more painful, red or hot  -Bruising and swelling that does not begin to improve within the 48 hours or gets worse suddenly.

## 2019-06-04 NOTE — PROGRESS NOTES
EXCISION OPERATIVE PROCEDURE NOTE    SURGEON: Marya Gardiner. Raheem Rodas MD    ASSISTANT:  Andreas Reynolds RN    REFERRING PROVIDER:  Sury Hudson MD    PREOPERATIVE DIAGNOSIS: Rule out epidermoid cyst/fragments of a ruptured cyst with history of enlarging size    POSTOPERATIVE DIAGNOSIS: SAME. OPERATIVE PROCEDURE: EXCISION    RECONSTRUCTION OF DEFECT: Intermediate layered closure    LOCATION: Right neck     SIZE OF LESION: 30x25 MM     SIZE OF LESION PLUS CIRCUMFERENTIAL MARGIN: 30x25 MM     FINAL REPAIR LENGTH:  25 MM    ANESTHESIA:5 CC XYLOCAINE 0.5% WITH EPINEPHRINE 1:200,000, BUFFERED. DURATION OF PROCEDURE: 30 MINUTES     POSTOPERATIVE OBSERVATION: 30 MINUTES     EBL: MINIMAL. SPECIMENS: 1    COMPLICATIONS: NONE     DESCRIPTION OF PROCEDURE: The patient was given a mirror and the biopsy site was identified, marked with surgical marking pen, and verified with the patient. Written consent was obtained. There was a time out for person and procedure verification. The operative site was cleansed with Chlorhexidine gluconate 4% solution, then cleaned off, dried, and draped sterilely. The lesion was excised via an overlying fusiform design down to subcutaneous fat. Upon incision, copious serous/cloudy drainage and fragments of cyst wall were expelled. Exploration of the cavity revealed no residual intact cystic lesion. The area was irrigated with saline. Hemostasis was achieved with electrosurgery. DEFECT MANAGEMENT:  Various closure modalities were discussed with the patient, and it was decided that an Intermediate layered closure would best preserve normal anatomic and functional relationships. Additional risk of wound dehiscence was discussed. The final incision lines were placed with respect for the patient's natural skin tension lines in a linear configuration to avoid functional and aesthetic distortion of adjacent free margins.  Following minimal undermining, meticulous hemostasis was obtained with spot monopolar electrocoagulation. Subcutaneous dead space and dermis were closed using 4-0 Vicryl buried subcutaneous interrupted suture and the epidermis was approximated with 5-0 Fast absorbing gut running epidermal sutures. The wound was cleaned with normal saline solution, dried off, Aquaphor ointment was applied, and the wound was covered. A dressing was applied for stabilization and light pressure. The patient was given detailed oral and written instructions on postoperative care. There were no complications. The patient left the Unit in good medical condition and was scheduled to return for suture removal/wound check as needed.

## 2019-06-11 ENCOUNTER — TELEPHONE (OUTPATIENT)
Dept: SURGERY | Age: 69
End: 2019-06-11

## 2019-06-11 NOTE — TELEPHONE ENCOUNTER
I reviewed results of the biopsy with the patient. Date of biopsy: 6/4/19  Site of biopsy: Right neck  Result: Acutely inflames keratin granuloma    Plan: No further treatment    The patient expressed understanding of the plan.

## 2019-06-19 ENCOUNTER — PATIENT MESSAGE (OUTPATIENT)
Dept: FAMILY MEDICINE CLINIC | Age: 69
End: 2019-06-19

## 2019-06-20 NOTE — TELEPHONE ENCOUNTER
From: Paula Koroma  To: Randee De La O MD  Sent: 6/19/2019 10:27 PM EDT  Subject: Non-Urgent Medical Question    I am pretty sure I have a urinary tract infection. I have had them in the past and the symptoms I have now are identical. Do I need an appointment or can I have a prescription to take care of the infection? Thank you.     Nighat Jones  [phone]

## 2019-09-09 RX ORDER — EMPAGLIFLOZIN 10 MG/1
TABLET, FILM COATED ORAL
Qty: 90 TABLET | Refills: 3 | Status: SHIPPED | OUTPATIENT
Start: 2019-09-09 | End: 2020-09-16

## 2019-09-25 ENCOUNTER — OFFICE VISIT (OUTPATIENT)
Dept: ENDOCRINOLOGY | Age: 69
End: 2019-09-25
Payer: COMMERCIAL

## 2019-09-25 VITALS
HEART RATE: 77 BPM | DIASTOLIC BLOOD PRESSURE: 74 MMHG | OXYGEN SATURATION: 97 % | BODY MASS INDEX: 30.72 KG/M2 | WEIGHT: 214.6 LBS | SYSTOLIC BLOOD PRESSURE: 124 MMHG | HEIGHT: 70 IN

## 2019-09-25 DIAGNOSIS — I10 ESSENTIAL HYPERTENSION: ICD-10-CM

## 2019-09-25 DIAGNOSIS — E78.2 MIXED HYPERLIPIDEMIA: ICD-10-CM

## 2019-09-25 LAB — HBA1C MFR BLD: 6.8 %

## 2019-09-25 PROCEDURE — 99214 OFFICE O/P EST MOD 30 MIN: CPT | Performed by: INTERNAL MEDICINE

## 2019-09-25 PROCEDURE — 1036F TOBACCO NON-USER: CPT | Performed by: INTERNAL MEDICINE

## 2019-09-25 PROCEDURE — 3017F COLORECTAL CA SCREEN DOC REV: CPT | Performed by: INTERNAL MEDICINE

## 2019-09-25 PROCEDURE — G8417 CALC BMI ABV UP PARAM F/U: HCPCS | Performed by: INTERNAL MEDICINE

## 2019-09-25 PROCEDURE — G8427 DOCREV CUR MEDS BY ELIG CLIN: HCPCS | Performed by: INTERNAL MEDICINE

## 2019-09-25 PROCEDURE — G8598 ASA/ANTIPLAT THER USED: HCPCS | Performed by: INTERNAL MEDICINE

## 2019-09-25 PROCEDURE — 1123F ACP DISCUSS/DSCN MKR DOCD: CPT | Performed by: INTERNAL MEDICINE

## 2019-09-25 PROCEDURE — 2022F DILAT RTA XM EVC RTNOPTHY: CPT | Performed by: INTERNAL MEDICINE

## 2019-09-25 PROCEDURE — 4040F PNEUMOC VAC/ADMIN/RCVD: CPT | Performed by: INTERNAL MEDICINE

## 2019-09-25 PROCEDURE — 3044F HG A1C LEVEL LT 7.0%: CPT | Performed by: INTERNAL MEDICINE

## 2019-09-25 PROCEDURE — 83036 HEMOGLOBIN GLYCOSYLATED A1C: CPT | Performed by: INTERNAL MEDICINE

## 2019-10-02 ENCOUNTER — PATIENT MESSAGE (OUTPATIENT)
Dept: PHARMACY | Facility: CLINIC | Age: 69
End: 2019-10-02

## 2019-11-04 ENCOUNTER — NURSE ONLY (OUTPATIENT)
Dept: FAMILY MEDICINE CLINIC | Age: 69
End: 2019-11-04
Payer: COMMERCIAL

## 2019-11-04 DIAGNOSIS — Z23 NEEDS FLU SHOT: Primary | ICD-10-CM

## 2019-11-04 PROCEDURE — 90471 IMMUNIZATION ADMIN: CPT | Performed by: FAMILY MEDICINE

## 2019-11-04 PROCEDURE — 90653 IIV ADJUVANT VACCINE IM: CPT | Performed by: FAMILY MEDICINE

## 2019-11-21 ENCOUNTER — OFFICE VISIT (OUTPATIENT)
Dept: DERMATOLOGY | Age: 69
End: 2019-11-21
Payer: COMMERCIAL

## 2019-11-21 DIAGNOSIS — L57.0 ACTINIC KERATOSES: Primary | ICD-10-CM

## 2019-11-21 DIAGNOSIS — Z12.83 SCREENING EXAM FOR SKIN CANCER: ICD-10-CM

## 2019-11-21 PROCEDURE — G8417 CALC BMI ABV UP PARAM F/U: HCPCS | Performed by: DERMATOLOGY

## 2019-11-21 PROCEDURE — 3017F COLORECTAL CA SCREEN DOC REV: CPT | Performed by: DERMATOLOGY

## 2019-11-21 PROCEDURE — 1036F TOBACCO NON-USER: CPT | Performed by: DERMATOLOGY

## 2019-11-21 PROCEDURE — 4040F PNEUMOC VAC/ADMIN/RCVD: CPT | Performed by: DERMATOLOGY

## 2019-11-21 PROCEDURE — G8427 DOCREV CUR MEDS BY ELIG CLIN: HCPCS | Performed by: DERMATOLOGY

## 2019-11-21 PROCEDURE — 17000 DESTRUCT PREMALG LESION: CPT | Performed by: DERMATOLOGY

## 2019-11-21 PROCEDURE — G8598 ASA/ANTIPLAT THER USED: HCPCS | Performed by: DERMATOLOGY

## 2019-11-21 PROCEDURE — G8482 FLU IMMUNIZE ORDER/ADMIN: HCPCS | Performed by: DERMATOLOGY

## 2019-11-21 PROCEDURE — 1123F ACP DISCUSS/DSCN MKR DOCD: CPT | Performed by: DERMATOLOGY

## 2019-11-21 PROCEDURE — 17003 DESTRUCT PREMALG LES 2-14: CPT | Performed by: DERMATOLOGY

## 2019-11-21 PROCEDURE — 99213 OFFICE O/P EST LOW 20 MIN: CPT | Performed by: DERMATOLOGY

## 2019-11-25 ENCOUNTER — OFFICE VISIT (OUTPATIENT)
Dept: CARDIOLOGY CLINIC | Age: 69
End: 2019-11-25
Payer: COMMERCIAL

## 2019-11-25 VITALS
WEIGHT: 215.8 LBS | HEART RATE: 67 BPM | BODY MASS INDEX: 30.9 KG/M2 | OXYGEN SATURATION: 96 % | DIASTOLIC BLOOD PRESSURE: 58 MMHG | SYSTOLIC BLOOD PRESSURE: 100 MMHG | HEIGHT: 70 IN

## 2019-11-25 DIAGNOSIS — I25.10 CORONARY ARTERY DISEASE INVOLVING NATIVE CORONARY ARTERY OF NATIVE HEART WITHOUT ANGINA PECTORIS: Primary | ICD-10-CM

## 2019-11-25 DIAGNOSIS — E78.2 MIXED HYPERLIPIDEMIA: Chronic | ICD-10-CM

## 2019-11-25 DIAGNOSIS — I10 ESSENTIAL HYPERTENSION: Chronic | ICD-10-CM

## 2019-11-25 PROCEDURE — 99214 OFFICE O/P EST MOD 30 MIN: CPT | Performed by: INTERNAL MEDICINE

## 2019-11-25 PROCEDURE — G8482 FLU IMMUNIZE ORDER/ADMIN: HCPCS | Performed by: INTERNAL MEDICINE

## 2019-11-25 PROCEDURE — G8427 DOCREV CUR MEDS BY ELIG CLIN: HCPCS | Performed by: INTERNAL MEDICINE

## 2019-11-25 PROCEDURE — 1123F ACP DISCUSS/DSCN MKR DOCD: CPT | Performed by: INTERNAL MEDICINE

## 2019-11-25 PROCEDURE — 2022F DILAT RTA XM EVC RTNOPTHY: CPT | Performed by: INTERNAL MEDICINE

## 2019-11-25 PROCEDURE — 4040F PNEUMOC VAC/ADMIN/RCVD: CPT | Performed by: INTERNAL MEDICINE

## 2019-11-25 PROCEDURE — G8598 ASA/ANTIPLAT THER USED: HCPCS | Performed by: INTERNAL MEDICINE

## 2019-11-25 PROCEDURE — 1036F TOBACCO NON-USER: CPT | Performed by: INTERNAL MEDICINE

## 2019-11-25 PROCEDURE — G8417 CALC BMI ABV UP PARAM F/U: HCPCS | Performed by: INTERNAL MEDICINE

## 2019-11-25 PROCEDURE — 3044F HG A1C LEVEL LT 7.0%: CPT | Performed by: INTERNAL MEDICINE

## 2019-11-25 PROCEDURE — 3017F COLORECTAL CA SCREEN DOC REV: CPT | Performed by: INTERNAL MEDICINE

## 2019-11-25 RX ORDER — LISINOPRIL 40 MG/1
TABLET ORAL
Qty: 90 TABLET | Refills: 3 | Status: SHIPPED | OUTPATIENT
Start: 2019-11-25 | End: 2021-01-05

## 2019-11-25 RX ORDER — CARVEDILOL 25 MG/1
TABLET ORAL
Qty: 180 TABLET | Refills: 4 | Status: SHIPPED | OUTPATIENT
Start: 2019-11-25 | End: 2021-01-25 | Stop reason: SDUPTHER

## 2019-11-25 RX ORDER — ATORVASTATIN CALCIUM 80 MG/1
TABLET, FILM COATED ORAL
Qty: 90 TABLET | Refills: 3 | Status: SHIPPED | OUTPATIENT
Start: 2019-11-25 | End: 2021-01-25 | Stop reason: SDUPTHER

## 2019-12-11 ENCOUNTER — OFFICE VISIT (OUTPATIENT)
Dept: FAMILY MEDICINE CLINIC | Age: 69
End: 2019-12-11
Payer: COMMERCIAL

## 2019-12-11 VITALS
BODY MASS INDEX: 30.64 KG/M2 | RESPIRATION RATE: 16 BRPM | SYSTOLIC BLOOD PRESSURE: 128 MMHG | DIASTOLIC BLOOD PRESSURE: 84 MMHG | HEART RATE: 64 BPM | WEIGHT: 214 LBS | HEIGHT: 70 IN | OXYGEN SATURATION: 98 %

## 2019-12-11 DIAGNOSIS — I10 BENIGN ESSENTIAL HTN: Primary | ICD-10-CM

## 2019-12-11 DIAGNOSIS — I99.8 FLUCTUATING BLOOD PRESSURE: ICD-10-CM

## 2019-12-11 PROCEDURE — 1123F ACP DISCUSS/DSCN MKR DOCD: CPT | Performed by: FAMILY MEDICINE

## 2019-12-11 PROCEDURE — 1036F TOBACCO NON-USER: CPT | Performed by: FAMILY MEDICINE

## 2019-12-11 PROCEDURE — G8482 FLU IMMUNIZE ORDER/ADMIN: HCPCS | Performed by: FAMILY MEDICINE

## 2019-12-11 PROCEDURE — G8417 CALC BMI ABV UP PARAM F/U: HCPCS | Performed by: FAMILY MEDICINE

## 2019-12-11 PROCEDURE — 99213 OFFICE O/P EST LOW 20 MIN: CPT | Performed by: FAMILY MEDICINE

## 2019-12-11 PROCEDURE — G8427 DOCREV CUR MEDS BY ELIG CLIN: HCPCS | Performed by: FAMILY MEDICINE

## 2019-12-11 PROCEDURE — 3017F COLORECTAL CA SCREEN DOC REV: CPT | Performed by: FAMILY MEDICINE

## 2019-12-11 PROCEDURE — 4040F PNEUMOC VAC/ADMIN/RCVD: CPT | Performed by: FAMILY MEDICINE

## 2019-12-11 PROCEDURE — G8598 ASA/ANTIPLAT THER USED: HCPCS | Performed by: FAMILY MEDICINE

## 2019-12-11 ASSESSMENT — ENCOUNTER SYMPTOMS
SHORTNESS OF BREATH: 0
CHEST TIGHTNESS: 0
ABDOMINAL PAIN: 0
COUGH: 0

## 2019-12-23 ENCOUNTER — HOSPITAL ENCOUNTER (OUTPATIENT)
Age: 69
Discharge: HOME | End: 2019-12-23
Payer: COMMERCIAL

## 2019-12-23 DIAGNOSIS — I25.10 CORONARY ARTERY DISEASE INVOLVING NATIVE CORONARY ARTERY OF NATIVE HEART WITHOUT ANGINA PECTORIS: ICD-10-CM

## 2019-12-23 DIAGNOSIS — I10 ESSENTIAL HYPERTENSION: ICD-10-CM

## 2019-12-23 DIAGNOSIS — E78.2 MIXED HYPERLIPIDEMIA: Chronic | ICD-10-CM

## 2019-12-23 LAB
A/G RATIO: 1.6 (ref 1.1–2.2)
ALBUMIN SERPL-MCNC: 4.2 G/DL (ref 3.4–5)
ALP BLD-CCNC: 60 U/L (ref 40–129)
ALT SERPL-CCNC: 33 U/L (ref 10–40)
ANION GAP SERPL CALCULATED.3IONS-SCNC: 16 MMOL/L (ref 3–16)
AST SERPL-CCNC: 23 U/L (ref 15–37)
BILIRUB SERPL-MCNC: 0.4 MG/DL (ref 0–1)
BUN BLDV-MCNC: 14 MG/DL (ref 7–20)
CALCIUM SERPL-MCNC: 9.4 MG/DL (ref 8.3–10.6)
CHLORIDE BLD-SCNC: 100 MMOL/L (ref 99–110)
CHOLESTEROL, FASTING: 102 MG/DL (ref 0–199)
CO2: 26 MMOL/L (ref 21–32)
CREAT SERPL-MCNC: 0.9 MG/DL (ref 0.8–1.3)
CREATININE URINE: 116.2 MG/DL (ref 39–259)
ESTIMATED AVERAGE GLUCOSE: 137 MG/DL
GFR AFRICAN AMERICAN: >60
GFR NON-AFRICAN AMERICAN: >60
GLOBULIN: 2.6 G/DL
GLUCOSE BLD-MCNC: 152 MG/DL (ref 70–99)
HBA1C MFR BLD: 6.4 %
HDLC SERPL-MCNC: 26 MG/DL (ref 40–60)
LDL CHOLESTEROL CALCULATED: ABNORMAL MG/DL
LDL CHOLESTEROL DIRECT: 50 MG/DL
MICROALBUMIN UR-MCNC: 3 MG/DL
MICROALBUMIN/CREAT UR-RTO: 25.8 MG/G (ref 0–30)
POTASSIUM SERPL-SCNC: 3.2 MMOL/L (ref 3.5–5.1)
SODIUM BLD-SCNC: 142 MMOL/L (ref 136–145)
TOTAL PROTEIN: 6.8 G/DL (ref 6.4–8.2)
TRIGLYCERIDE, FASTING: 348 MG/DL (ref 0–150)
TSH SERPL DL<=0.05 MIU/L-ACNC: 2.04 UIU/ML (ref 0.27–4.2)
VLDLC SERPL CALC-MCNC: ABNORMAL MG/DL

## 2019-12-23 PROCEDURE — 83036 HEMOGLOBIN GLYCOSYLATED A1C: CPT

## 2019-12-23 PROCEDURE — 84443 ASSAY THYROID STIM HORMONE: CPT

## 2019-12-23 PROCEDURE — 82570 ASSAY OF URINE CREATININE: CPT

## 2019-12-23 PROCEDURE — 80061 LIPID PANEL: CPT

## 2019-12-23 PROCEDURE — 36415 COLL VENOUS BLD VENIPUNCTURE: CPT

## 2019-12-23 PROCEDURE — 80053 COMPREHEN METABOLIC PANEL: CPT

## 2019-12-23 PROCEDURE — 82043 UR ALBUMIN QUANTITATIVE: CPT

## 2019-12-24 DIAGNOSIS — E87.6 HYPOKALEMIA: Primary | ICD-10-CM

## 2019-12-24 RX ORDER — POTASSIUM CHLORIDE 20 MEQ/1
20 TABLET, EXTENDED RELEASE ORAL DAILY
Qty: 90 TABLET | Refills: 1 | Status: SHIPPED | OUTPATIENT
Start: 2019-12-24 | End: 2020-06-15

## 2020-01-15 LAB
CATARACTS: POSITIVE
DIABETIC RETINOPATHY: NORMAL
GLAUCOMA: NEGATIVE
INTRAOCULAR PRESSURE EYE: NORMAL
VISUAL ACUITY DISTANCE LEFT EYE: NORMAL
VISUAL ACUITY DISTANCE RIGHT EYE: NORMAL

## 2020-03-19 RX ORDER — CHLORTHALIDONE 25 MG/1
TABLET ORAL
Qty: 90 TABLET | Refills: 3 | Status: SHIPPED | OUTPATIENT
Start: 2020-03-19 | End: 2021-01-25 | Stop reason: SDUPTHER

## 2020-03-20 RX ORDER — INSULIN HUMAN 500 [IU]/ML
40 INJECTION, SOLUTION SUBCUTANEOUS
Qty: 30 ML | Refills: 2 | Status: SHIPPED | OUTPATIENT
Start: 2020-03-20 | End: 2020-10-28 | Stop reason: SDUPTHER

## 2020-03-25 PROBLEM — E78.2 MIXED HYPERLIPIDEMIA: Status: RESOLVED | Noted: 2017-05-10 | Resolved: 2020-03-24

## 2020-05-06 ENCOUNTER — TELEPHONE (OUTPATIENT)
Dept: PHARMACY | Facility: CLINIC | Age: 70
End: 2020-05-06

## 2020-05-07 ENCOUNTER — SCHEDULED TELEPHONE ENCOUNTER (OUTPATIENT)
Dept: PHARMACY | Facility: CLINIC | Age: 70
End: 2020-05-07

## 2020-05-07 NOTE — TELEPHONE ENCOUNTER
CLINICAL PHARMACY NOTE - [address] [place], [place] [address] [facility]    Karime Sylvester is a 78 y.o. male enrolled in the [address] [place], [place] [address] [facility] Diabetes Program. Patient provided writer with verbal consent to remain in the program for this year. Medications:  Current Outpatient Medications   Medication Sig Dispense Refill    insulin regular human (HUMULIN R U-500 KWIKPEN) 500 UNIT/ML SOPN concentrated injection pen Inject 40 Units into the skin 3 times daily (before meals) Inject 40 units in AM, 40 units with Lunch and Dinner. 30 mL 2    chlorthalidone (HYGROTON) 25 MG tablet TAKE 1 TABLET BY MOUTH ONE TIME A DAY 90 tablet 3    potassium chloride (KLOR-CON M) 20 MEQ extended release tablet Take 1 tablet by mouth daily 90 tablet 1    atorvastatin (LIPITOR) 80 MG tablet TAKE 1 TABLET BY MOUTH ONCE DAILY 90 tablet 3    carvedilol (COREG) 25 MG tablet TAKE ONE TABLET BY MOUTH TWICE A DAY WITH MEALS 180 tablet 4    lisinopril (PRINIVIL;ZESTRIL) 40 MG tablet TAKE ONE TABLET BY MOUTH ONE TIME A DAY 90 tablet 3    JARDIANCE 10 MG tablet TAKE 1 TABLET BY MOUTH ONE TIME A DAY 90 tablet 3    Blood Glucose Monitoring Suppl (PRODIGY AUTOCODE BLOOD GLUCOSE) w/Device KIT Use to test blood glucose 4 times per day 1 kit 0    PRODIGY LANCETS 28G MISC 1 each by Does not apply route 4 times daily (after meals and at bedtime) 400 each 3    Omega-3 Fatty Acids (FISH OIL) 1000 MG CAPS Take 3,000 mg by mouth 2 times daily       aspirin 81 MG tablet Take 81 mg by mouth daily.  Multiple Vitamins-Minerals (MULTIVITAMIN PO) Take  by mouth daily.  Insulin Pen Needle 31G X 8 MM MISC USE THREE TIMES DAILY 120 each 5     No current facility-administered medications for this visit.        Current Pharmacy: home delivery  Current testing supplies/frequency: [employer] usually TID after meals, and PRN lows  Pen needles/syringes: generic    Allergies:  No Known Allergies     Vitals/Labs:  BP Readings from compliance:   · Jardiance: 09/10/2019 90ds, 12/15/2019 90ds, 03/19/2020 90ds  · Atorvastatin: 07/05/2019 90ds, 10/08/2019 90ds, 12/01/2019 90ds, 04/18/2020 90ds  · Lisinopril: 09/15/2019 90ds, 11/25/2019 90ds, 03/19/2020 90ds  - Current exercise: walking, biking riding - changing season to warmer weather making easier to fit in    Other Considerations:  - Blood Pressure Goal: BP less than 140/90 mmHg due to history of DM: Is at blood pressure goal.   - Lipids: Patient is prescribed high-intensity statin therapy. - Smoking status: Former smoker    PLAN:  - DM program gaps identified:   · Initial requirements: OV with provider for DM (1st) and A1c (1st)   · Ongoing requirements: OV with provider for DM (2nd), A1c (2nd), Lipid panel, Urine microalbumin, Influenza vaccination for [id number] and Medication adherence over 70%   - Education to patient: reviewed program, discussed insulin pump therapy   - Follow up: PCP for identified gaps or as scheduled below  - Upcoming appointments:   Date Time Provider [place]   5/13/2020  4:40 PM Whit Garcia MD [facility]   6/11/2020 10:00 AM Kristal Prieto MD [facility]   11/16/2020  9:30 AM Regino Dela Cruz MD And D[facility] ASHLEIGH Lunsford, PharmD, [address]   [phone]    [address] [place] [address]  [phone], Option 7    =======================================================    For Pharmacy Admin Tracking Only  PHSO: Yes  Total # of Interventions Recommended: 1  - Updated Order #: 1 Updated Order Reason(s):  Other  Recommended intervention potential cost savings: 1  Total Interventions Accepted: 1  Time Spent (min): 45

## 2020-05-13 ENCOUNTER — VIRTUAL VISIT (OUTPATIENT)
Dept: ENDOCRINOLOGY | Age: 70
End: 2020-05-13
Payer: COMMERCIAL

## 2020-05-13 PROCEDURE — G8427 DOCREV CUR MEDS BY ELIG CLIN: HCPCS | Performed by: INTERNAL MEDICINE

## 2020-05-13 PROCEDURE — 2022F DILAT RTA XM EVC RTNOPTHY: CPT | Performed by: INTERNAL MEDICINE

## 2020-05-13 PROCEDURE — 1123F ACP DISCUSS/DSCN MKR DOCD: CPT | Performed by: INTERNAL MEDICINE

## 2020-05-13 PROCEDURE — 3017F COLORECTAL CA SCREEN DOC REV: CPT | Performed by: INTERNAL MEDICINE

## 2020-05-13 PROCEDURE — 99214 OFFICE O/P EST MOD 30 MIN: CPT | Performed by: INTERNAL MEDICINE

## 2020-05-13 PROCEDURE — 3046F HEMOGLOBIN A1C LEVEL >9.0%: CPT | Performed by: INTERNAL MEDICINE

## 2020-05-13 PROCEDURE — 4040F PNEUMOC VAC/ADMIN/RCVD: CPT | Performed by: INTERNAL MEDICINE

## 2020-06-16 RX ORDER — POTASSIUM CHLORIDE 20 MEQ/1
TABLET, EXTENDED RELEASE ORAL
Qty: 90 TABLET | Refills: 2 | Status: SHIPPED | OUTPATIENT
Start: 2020-06-16 | End: 2021-01-25 | Stop reason: SDUPTHER

## 2020-07-13 NOTE — PROGRESS NOTES
Patient: Alcus Collet is a 78 y.o.male who presents today with the following Chief Complaint(s):  Chief Complaint   Patient presents with    Annual Exam     DM         HPI: Pt with DM2/1.5? (Dr Vin Jacobson), CAD s/p MI 2006 (Dr Virgil Brown q yr), hld and htn  Is on triple bp therapy. Feet neuropathy remains much improved since DM came under control 2014, stable lately. Is on humulin R 45 u tid with meals, does not miss. 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 Pt follows his bp as in 2017 had on/off L eye vision loss that resolved on its own. JOSEPH CHEN suggested sx's were 2/2 uncontrolled bp. No further sx's. Conts to go to gym 5 days per wk, wears mask. Lab Results   Component Value Date    LABA1C 6.3 07/15/2020    LABA1C 6.4 12/23/2019    LABA1C 6.8 09/25/2019     :    Current Outpatient Medications   Medication Sig Dispense Refill    potassium chloride (KLOR-CON M) 20 MEQ extended release tablet TAKE 1 TABLET BY MOUTH ONE TIME A DAY 90 tablet 2    insulin regular human (HUMULIN R U-500 KWIKPEN) 500 UNIT/ML SOPN concentrated injection pen Inject 40 Units into the skin 3 times daily (before meals) Inject 40 units in AM, 40 units with Lunch and Dinner.  30 mL 2    chlorthalidone (HYGROTON) 25 MG tablet TAKE 1 TABLET BY MOUTH ONE TIME A DAY 90 tablet 3    Insulin Pen Needle 31G X 8 MM MISC USE THREE TIMES DAILY 120 each 5    atorvastatin (LIPITOR) 80 MG tablet TAKE 1 TABLET BY MOUTH ONCE DAILY 90 tablet 3    carvedilol (COREG) 25 MG tablet TAKE ONE TABLET BY MOUTH TWICE A DAY WITH MEALS 180 tablet 4    lisinopril (PRINIVIL;ZESTRIL) 40 MG tablet TAKE ONE TABLET BY MOUTH ONE TIME A DAY 90 tablet 3    JARDIANCE 10 MG tablet TAKE 1 TABLET BY MOUTH ONE TIME A DAY 90 tablet 3    Blood Glucose Monitoring Suppl (PRODIGY AUTOCODE BLOOD GLUCOSE) w/Device KIT Use to test blood glucose 4 times per day 1 kit 0    PRODIGY LANCETS 28G MISC 1 each by Does not apply route 4 times daily (after meals and at bedtime) 400 each 3    Omega-3 Fatty Acids (FISH OIL) 1000 MG CAPS Take 3,000 mg by mouth 2 times daily       aspirin 81 MG tablet Take 81 mg by mouth daily.  Multiple Vitamins-Minerals (MULTIVITAMIN PO) Take  by mouth daily. No current facility-administered medications for this visit. Patient's past medical history,surgical history, family history, medications,  and allergies  were all reviewed and updated as appropriate today. Review of Systems   Constitutional: Negative for activity change, appetite change, chills, fatigue, fever and unexpected weight change. HENT: Negative. Eyes: Negative. Respiratory: Negative for cough, chest tightness, shortness of breath and wheezing. Cardiovascular: Negative for chest pain, palpitations and leg swelling. Gastrointestinal: Negative for abdominal distention, abdominal pain, blood in stool, constipation, diarrhea, nausea and vomiting. Genitourinary: Negative for difficulty urinating and dysuria. Musculoskeletal: Negative for arthralgias. Skin: Negative for color change, pallor and rash. Neurological: Positive for numbness (feet). Negative for dizziness, syncope, weakness, light-headedness and headaches. Hematological: Negative. Psychiatric/Behavioral: Negative for dysphoric mood and sleep disturbance. The patient is not nervous/anxious. Physical Exam  Constitutional:       General: He is not in acute distress. Appearance: Normal appearance. He is well-developed. He is not ill-appearing. HENT:      Head: Normocephalic and atraumatic. Right Ear: Tympanic membrane, ear canal and external ear normal.      Left Ear: Ear canal and external ear normal.      Mouth/Throat:      Pharynx: Oropharynx is clear. No oropharyngeal exudate. Eyes:      General: No scleral icterus. Right eye: No discharge. Left eye: No discharge. Extraocular Movements: Extraocular movements intact.       Conjunctiva/sclera: Lipid Panel  -     Comprehensive Metabolic Panel   It is noted that pt was on pump in distant past.    Benign essential HTN  -     Comprehensive Metabolic Panel

## 2020-07-15 ENCOUNTER — OFFICE VISIT (OUTPATIENT)
Dept: FAMILY MEDICINE CLINIC | Age: 70
End: 2020-07-15
Payer: COMMERCIAL

## 2020-07-15 VITALS
WEIGHT: 215 LBS | OXYGEN SATURATION: 98 % | HEIGHT: 70 IN | SYSTOLIC BLOOD PRESSURE: 128 MMHG | TEMPERATURE: 97.8 F | BODY MASS INDEX: 30.78 KG/M2 | HEART RATE: 74 BPM | DIASTOLIC BLOOD PRESSURE: 82 MMHG

## 2020-07-15 LAB
A/G RATIO: 2 (ref 1.1–2.2)
ALBUMIN SERPL-MCNC: 4.8 G/DL (ref 3.4–5)
ALP BLD-CCNC: 60 U/L (ref 40–129)
ALT SERPL-CCNC: 30 U/L (ref 10–40)
ANION GAP SERPL CALCULATED.3IONS-SCNC: 18 MMOL/L (ref 3–16)
AST SERPL-CCNC: 22 U/L (ref 15–37)
BILIRUB SERPL-MCNC: 0.5 MG/DL (ref 0–1)
BUN BLDV-MCNC: 19 MG/DL (ref 7–20)
CALCIUM SERPL-MCNC: 9.7 MG/DL (ref 8.3–10.6)
CHLORIDE BLD-SCNC: 99 MMOL/L (ref 99–110)
CHOLESTEROL, TOTAL: 113 MG/DL (ref 0–199)
CO2: 23 MMOL/L (ref 21–32)
CREAT SERPL-MCNC: 0.8 MG/DL (ref 0.8–1.3)
GFR AFRICAN AMERICAN: >60
GFR NON-AFRICAN AMERICAN: >60
GLOBULIN: 2.4 G/DL
GLUCOSE BLD-MCNC: 141 MG/DL (ref 70–99)
HBA1C MFR BLD: 6.3 %
HDLC SERPL-MCNC: 28 MG/DL (ref 40–60)
LDL CHOLESTEROL CALCULATED: 41 MG/DL
POTASSIUM SERPL-SCNC: 3.8 MMOL/L (ref 3.5–5.1)
PROSTATE SPECIFIC ANTIGEN: 1.08 NG/ML (ref 0–4)
SODIUM BLD-SCNC: 140 MMOL/L (ref 136–145)
TOTAL PROTEIN: 7.2 G/DL (ref 6.4–8.2)
TRIGL SERPL-MCNC: 220 MG/DL (ref 0–150)
VLDLC SERPL CALC-MCNC: 44 MG/DL

## 2020-07-15 PROCEDURE — 36415 COLL VENOUS BLD VENIPUNCTURE: CPT | Performed by: FAMILY MEDICINE

## 2020-07-15 PROCEDURE — 99397 PER PM REEVAL EST PAT 65+ YR: CPT | Performed by: FAMILY MEDICINE

## 2020-07-15 PROCEDURE — 83036 HEMOGLOBIN GLYCOSYLATED A1C: CPT | Performed by: FAMILY MEDICINE

## 2020-07-15 ASSESSMENT — ENCOUNTER SYMPTOMS
CHEST TIGHTNESS: 0
CONSTIPATION: 0
SHORTNESS OF BREATH: 0
DIARRHEA: 0
BLOOD IN STOOL: 0
COUGH: 0
ABDOMINAL DISTENTION: 0
ABDOMINAL PAIN: 0
COLOR CHANGE: 0
NAUSEA: 0
VOMITING: 0
EYES NEGATIVE: 1
WHEEZING: 0

## 2020-07-31 ENCOUNTER — TELEPHONE (OUTPATIENT)
Dept: FAMILY MEDICINE CLINIC | Age: 70
End: 2020-07-31

## 2020-08-11 ENCOUNTER — TELEPHONE (OUTPATIENT)
Dept: ENDOCRINOLOGY | Age: 70
End: 2020-08-11

## 2020-08-11 NOTE — TELEPHONE ENCOUNTER
Need last chart note addended to say pt tests sugars 4 times a day. For the [facility].

## 2020-08-11 NOTE — TELEPHONE ENCOUNTER
[employer] called and said they need the med necessity addended to say the patient tests 4 or more times a day per the patient.  He will resend the fax

## 2020-08-17 ENCOUNTER — TELEPHONE (OUTPATIENT)
Dept: FAMILY MEDICINE CLINIC | Age: 70
End: 2020-08-17

## 2020-08-18 ENCOUNTER — OFFICE VISIT (OUTPATIENT)
Dept: FAMILY MEDICINE CLINIC | Age: 70
End: 2020-08-18
Payer: COMMERCIAL

## 2020-08-18 VITALS
TEMPERATURE: 97.5 F | HEART RATE: 77 BPM | WEIGHT: 210 LBS | BODY MASS INDEX: 30.13 KG/M2 | DIASTOLIC BLOOD PRESSURE: 68 MMHG | OXYGEN SATURATION: 98 % | SYSTOLIC BLOOD PRESSURE: 122 MMHG

## 2020-08-18 PROCEDURE — 99212 OFFICE O/P EST SF 10 MIN: CPT | Performed by: NURSE PRACTITIONER

## 2020-08-18 PROCEDURE — G8427 DOCREV CUR MEDS BY ELIG CLIN: HCPCS | Performed by: NURSE PRACTITIONER

## 2020-08-18 PROCEDURE — G8417 CALC BMI ABV UP PARAM F/U: HCPCS | Performed by: NURSE PRACTITIONER

## 2020-08-18 ASSESSMENT — PATIENT HEALTH QUESTIONNAIRE - PHQ9
SUM OF ALL RESPONSES TO PHQ QUESTIONS 1-9: 0
2. FEELING DOWN, DEPRESSED OR HOPELESS: 0
SUM OF ALL RESPONSES TO PHQ9 QUESTIONS 1 & 2: 0
1. LITTLE INTEREST OR PLEASURE IN DOING THINGS: 0
SUM OF ALL RESPONSES TO PHQ QUESTIONS 1-9: 0

## 2020-08-18 NOTE — PROGRESS NOTES
[facility]  [phone]  Fax: [phone]   Pre-operative History and Physical    Rikki Lara is a 78 y.o. male who is referred by Dr. Chato Farris for a consultation for preoperative physical examination and medical clearance for surgery. Patient is scheduled to have Left eye surgery Pars plana vitrectomy possible laser at [facility] on 8/20/2020. DIAGNOSIS: blood clot of left eye        History Obtained From:  patient    HISTORY OF PRESENT ILLNESS:    The patient is a 78 y.o. male with significant past medical history of blood clot left eye who presents for pre-op. Past Medical History:   Diagnosis Date    CAD (coronary artery disease)     Heart attack ([facility] [address] 3/2006    Hyperlipidemia     Hypertension     PONV (postoperative nausea and vomiting)     Type II or unspecified type diabetes mellitus without mention of complication, not stated as uncontrolled      Past Surgical History:   Procedure Laterality Date    CARDIAC SURGERY  2006    PTCA, stent LAD, Dr Shen Tony  2008    right rotator cuff tear, [place]    SHOULDER SURGERY Left 1/2015    TONSILLECTOMY  1979     Current Outpatient Medications   Medication Sig Dispense Refill    potassium chloride (KLOR-CON M) 20 MEQ extended release tablet TAKE 1 TABLET BY MOUTH ONE TIME A DAY 90 tablet 2    insulin regular human (HUMULIN R U-500 KWIKPEN) 500 UNIT/ML SOPN concentrated injection pen Inject 40 Units into the skin 3 times daily (before meals) Inject 40 units in AM, 40 units with Lunch and Dinner.  30 mL 2    chlorthalidone (HYGROTON) 25 MG tablet TAKE 1 TABLET BY MOUTH ONE TIME A DAY 90 tablet 3    Insulin Pen Needle 31G X 8 MM MISC USE THREE TIMES DAILY 120 each 5    atorvastatin (LIPITOR) 80 MG tablet TAKE 1 TABLET BY MOUTH ONCE DAILY 90 tablet 3    carvedilol (COREG) 25 MG tablet TAKE ONE TABLET BY MOUTH TWICE A DAY WITH MEALS 180 tablet 4    lisinopril (PRINIVIL;ZESTRIL) 40 MG tablet TAKE ONE TABLET BY MOUTH ONE TIME A DAY 90 tablet 3    JARDIANCE 10 MG tablet TAKE 1 TABLET BY MOUTH ONE TIME A DAY 90 tablet 3    Blood Glucose Monitoring Suppl (PRODIGY AUTOCODE BLOOD GLUCOSE) w/Device KIT Use to test blood glucose 4 times per day 1 kit 0    PRODIGY LANCETS 28G MISC 1 each by Does not apply route 4 times daily (after meals and at bedtime) 400 each 3    Omega-3 Fatty Acids (FISH OIL) 1000 MG CAPS Take 3,000 mg by mouth 2 times daily       aspirin 81 MG tablet Take 81 mg by mouth daily.  Multiple Vitamins-Minerals (MULTIVITAMIN PO) Take  by mouth daily. No current facility-administered medications for this visit. Allergies:  Patient has no known allergies. History of allergic reaction to anesthesia:  No     Social History     Tobacco Use   Smoking Status Former Smoker    Start date: 1967    Last attempt to quit: 1985    Years since quittin.6   Smokeless Tobacco Never Used   Tobacco Comment    stopped smoking over 20 years ago     The patient states he drinks 1 per week.     Family History   Problem Relation Age of Onset    Cancer Mother         pancreas    Cancer Father         esophageal    Diabetes Sister     Diabetes Paternal Grandmother     Diabetes Paternal Grandfather        REVIEW OF SYSTEMS:    CONSTITUTIONAL:  negative  EYES:  negative  HEENT:  negative  RESPIRATORY:  negative  CARDIOVASCULAR:  negative  GASTROINTESTINAL:  negative  GENITOURINARY:  negative  INTEGUMENT/BREAST:  negative  HEMATOLOGIC/LYMPHATIC:  negative  ALLERGIC/IMMUNOLOGIC:  negative  ENDOCRINE:  negative  MUSCULOSKELETAL:  negative  NEUROLOGICAL:  negative    PHYSICAL EXAM:      /68   Pulse 77   Temp 97.5 °F (36.4 °C) (Temporal)   Wt 210 lb (95.3 kg)   SpO2 98%   BMI 30.13 kg/m²     CONSTITUTIONAL:  awake, alert, cooperative, no apparent distress, and appears stated age    Eyes:  Lids and lashes normal, pupils equal, round and reactive to light, extra ocular muscles intact, sclera clear, conjunctiva normal    Head/ENT:  Normocephalic, without obvious abnormality, atramatic, sinuses nontender on palpation, external ears without lesions, oral pharynx with moist mucus membranes, tonsils without erythema or exudates, gums normal and good dentition. Neck:  Supple, symmetrical, trachea midline, no adenopathy, thyroid symmetric, not enlarged and no tenderness, skin normal    Heart:  Normal apical impulse, regular rate and rhythm, normal S1 and S2, no S3 or S4, and no murmur noted    Lungs:  No increased work of breathing, good air exchange, clear to auscultation bilaterally, no crackles or wheezing    Abdomen:normal bowel sounds, soft, non-distended, non-tender, no masses palpated    Extremities:  No clubbing, cyanosis, or edema    NEUROLOGIC:  Awake, alert, oriented to name, place and time. ASSESSMENT AND PLAN:    1. Patient is a 78 y.o. male with above specified procedure planned on 8/20/2020 with Dr. Chante Brown at [facility]. 2. Stop NSAIDS/ASA medications 7-10 days prior to procedure. 3.Patient is cleared for surgery  4. Pre-op copy given to patient to take with him.      Dakota Sims, [address],  [address] [place] [address], [address] [place] [address]  [phone]

## 2020-08-24 RX ORDER — PEN NEEDLE, DIABETIC 31 GX5/16"
NEEDLE, DISPOSABLE MISCELLANEOUS
Qty: 100 EACH | Refills: 5 | Status: SHIPPED | OUTPATIENT
Start: 2020-08-24 | End: 2021-03-02

## 2020-08-24 NOTE — TELEPHONE ENCOUNTER
Medication:   Requested Prescriptions     Pending Prescriptions Disp Refills    TRUEPLUS PEN NEEDLES 31G X 8 MM [facility] [Pharmacy Med Name: Annabellaa Sax NEEDLE 31G X 8MM] 100 each 5     Sig: USE THREE TIMES DAILY         Last appt: 5/13/2020   Next appt: 11/18/2020    Last OARRS: No flowsheet data found.

## 2020-09-16 RX ORDER — EMPAGLIFLOZIN 10 MG/1
TABLET, FILM COATED ORAL
Qty: 90 TABLET | Refills: 3 | Status: SHIPPED | OUTPATIENT
Start: 2020-09-16 | End: 2020-09-25

## 2020-09-16 NOTE — TELEPHONE ENCOUNTER
LOV: 5/13/2020    NOV: 11/18/2020    DOSE: 10 mg     Frequency: Daily     Pharmacy as Pended:     Per LOV Note: -Continue jardiance 10 mg daily.     Per Last PHONE NOTE:     Lab Results   Component Value Date    LABA1C 6.3 07/15/2020

## 2020-09-22 ENCOUNTER — OFFICE VISIT (OUTPATIENT)
Dept: FAMILY MEDICINE CLINIC | Age: 70
End: 2020-09-22
Payer: COMMERCIAL

## 2020-09-22 VITALS
TEMPERATURE: 97.7 F | BODY MASS INDEX: 31.7 KG/M2 | HEART RATE: 70 BPM | WEIGHT: 214 LBS | DIASTOLIC BLOOD PRESSURE: 74 MMHG | HEIGHT: 69 IN | SYSTOLIC BLOOD PRESSURE: 130 MMHG | OXYGEN SATURATION: 97 %

## 2020-09-22 PROCEDURE — 2022F DILAT RTA XM EVC RTNOPTHY: CPT | Performed by: FAMILY MEDICINE

## 2020-09-22 PROCEDURE — 3044F HG A1C LEVEL LT 7.0%: CPT | Performed by: FAMILY MEDICINE

## 2020-09-22 PROCEDURE — 99215 OFFICE O/P EST HI 40 MIN: CPT | Performed by: FAMILY MEDICINE

## 2020-09-22 PROCEDURE — 1123F ACP DISCUSS/DSCN MKR DOCD: CPT | Performed by: FAMILY MEDICINE

## 2020-09-22 PROCEDURE — 4040F PNEUMOC VAC/ADMIN/RCVD: CPT | Performed by: FAMILY MEDICINE

## 2020-09-22 PROCEDURE — 1036F TOBACCO NON-USER: CPT | Performed by: FAMILY MEDICINE

## 2020-09-22 PROCEDURE — 3017F COLORECTAL CA SCREEN DOC REV: CPT | Performed by: FAMILY MEDICINE

## 2020-09-22 PROCEDURE — G8427 DOCREV CUR MEDS BY ELIG CLIN: HCPCS | Performed by: FAMILY MEDICINE

## 2020-09-22 PROCEDURE — G8417 CALC BMI ABV UP PARAM F/U: HCPCS | Performed by: FAMILY MEDICINE

## 2020-09-22 ASSESSMENT — ENCOUNTER SYMPTOMS
BLOOD IN STOOL: 0
COUGH: 0

## 2020-09-22 NOTE — PROGRESS NOTES
CC: preop exam    Subjective:   Parveen Mensah is a 78 y.o. male here for preoperative consultation for R eye cataract surgery on 0/98/46    Prior complications from surgery or anesthesia? no  Uncontrolled blood pressure? no  Recent illnesses: none    Able to complete activities such as climbing steps or mowing the lawn without chest pain or shortness of breath? yes  Recent cardiac procedures such as stent placement or bypass surgery? no    Other problems:    DMII  A1c 6.3 7/15/20, on jardiance, humulin    HTN  Controlled, taking bp meds as prescribed    HLD  On statin    No Known Allergies    Past Medical History:   Diagnosis Date    CAD (coronary artery disease)     Heart attack ([facility] [address] 3/2006    Hyperlipidemia     Hypertension     PONV (postoperative nausea and vomiting)     Type II or unspecified type diabetes mellitus without mention of complication, not stated as uncontrolled           Past Surgical History:   Procedure Laterality Date    CARDIAC SURGERY  2006    PTCA, stent LAD, Dr Rocky Mortimer  2008    right rotator cuff tear, [place]    SHOULDER SURGERY Left 1/2015    TONSILLECTOMY  1979          Current Outpatient Medications on File Prior to Visit   Medication Sig Dispense Refill    JARDIANCE 10 MG tablet TAKE 1 TABLET BY MOUTH ONE TIME A DAY 90 tablet 3    TRUEPLUS PEN NEEDLES 31G X 8 MM MISC USE THREE TIMES DAILY 100 each 5    potassium chloride (KLOR-CON M) 20 MEQ extended release tablet TAKE 1 TABLET BY MOUTH ONE TIME A DAY 90 tablet 2    insulin regular human (HUMULIN R U-500 KWIKPEN) 500 UNIT/ML SOPN concentrated injection pen Inject 40 Units into the skin 3 times daily (before meals) Inject 40 units in AM, 40 units with Lunch and Dinner.  30 mL 2    chlorthalidone (HYGROTON) 25 MG tablet TAKE 1 TABLET BY MOUTH ONE TIME A DAY 90 tablet 3    atorvastatin (LIPITOR) 80 MG tablet TAKE 1 TABLET BY MOUTH ONCE DAILY 90 tablet 3    carvedilol None     Emotionally abused: None     Physically abused: None     Forced sexual activity: None   Other Topics Concern    None   Social History Narrative    None       Family History   Problem Relation Age of Onset    Cancer Mother         pancreas    Cancer Father         esophageal    Diabetes Sister     Diabetes Paternal Grandmother     Diabetes Paternal Grandfather        Review of Systems   Constitutional: Negative for fever. Respiratory: Negative for cough. Cardiovascular: Negative for chest pain. Gastrointestinal: Negative for blood in stool. All other systems reviewed and negative       Objective     /74   Pulse 70   Temp 97.7 °F (36.5 °C) (Temporal)   Ht 5' 9\" (1.753 m)   Wt 214 lb (97.1 kg)   SpO2 97%   BMI 31.60 kg/m²   Physical Exam:  Constitutional: Pt appears well-developed and well-nourished   Ears, Nose, Mouth & Throat: external inspection of ears and nose show no scars, lesions or masses, pt can hear finger rub bilaterally with hearing aids in  Eyes: Conjunctivae and pupils are normal in appearance   Neck: neck is supple. No thyromegaly present   Cardiovascular: Normal rate. No lower ext edema present  Respiratory: Effort normal and breath sounds normal. No respiratory distress. He has no wheezes. He has no rales. Gastrointestinal: Soft. There is no tenderness.  No hernias  Musculoskeletal: Normal range of motion of extremities, normal gait  Skin: Skin is warm and dry   Psychiatric: judgment and insight appropriate for age, no agitation    Lab Results   Component Value Date    WBC 6.1 03/06/2019    HGB 14.5 03/06/2019    HCT 43.3 03/06/2019     03/06/2019    CHOL 113 07/15/2020    TRIG 220 (H) 07/15/2020    HDL 28 (L) 07/15/2020    LDLDIRECT 50 12/23/2019    LDLCALC 41 07/15/2020    ALT 30 07/15/2020    AST 22 07/15/2020     07/15/2020    K 3.8 07/15/2020    CL 99 07/15/2020    CREATININE 0.8 07/15/2020    BUN 19 07/15/2020    CO2 23 07/15/2020    TSH 2.04 12/23/2019    PSA 1.08 07/15/2020    GLUCOSE 141 (H) 07/15/2020    TSHREFLEX 2.70 12/10/2015       No results found for this visit on 09/22/20. Revised [place] Cardiac RiskIndex  1. High risk surgical procedures (intraperitoneal, intrathoracic, suprainguinal vascular) no  2. History of ischemic heart disease (history of myocardial infarction,history of positive exercise test, current complaint of chest pain considered to be due to ischemia, use of nitrate therapy, EKG with pathological Q waves) yes  3. History of congestiveheart failure (history of congestive heart failure, pulmonary edema, paroxysmal nocturnal dyspnea, bilateral rales or S3 gallop) no  4:  History of cerebrovascular disease (history oftransient ischemic attack or stroke) no   5. Preoperative treatment with insulin - no  6. Preoperative serum creatinine >2.0 mg/dl - no    Each risk Factor is assigned one point. Total Points 1    Points Class Risk of Major cardiac event (includes myocardial infarction, pulmonary edema, ventricularfibrillation, primary cardiac arrest and complete heart block). 0  I 0.4%   1  II 0.9%   2  III 6.6%   3 or more  IV 11%       Assessment   Daphney Schulte was seen today for pre-op exam.    Diagnoses and all orders for this visit:    Pre-op exam  Stop NSAIDS/ASA medications 7-10 days prior to procedure.   Patient is cleared for surgery  Pre-op formed and sent to ophtho    Cataract of right eye, unspecified cataract type  Surgery    Controlled type 2 diabetes mellitus with diabetic autonomic neuropathy, with long-term current use of insulin (HCC)  Hold insulin and jardiance morning of surgery    Benign essential HTN  Cont BB day of surgery, hold lisinopril morning of surgery    Hyperlipidemia, unspecified hyperlipidemia type  Hold statin morning of surgery    Funmi Oliveros MD  9/22/2020

## 2020-09-25 RX ORDER — EMPAGLIFLOZIN 10 MG/1
TABLET, FILM COATED ORAL
Qty: 90 TABLET | Refills: 3 | Status: SHIPPED | OUTPATIENT
Start: 2020-09-25 | End: 2021-04-06 | Stop reason: SDUPTHER

## 2020-09-25 NOTE — TELEPHONE ENCOUNTER
Medication:   Requested Prescriptions     Pending Prescriptions Disp Refills    JARDIANCE 10 MG tablet [Pharmacy Med Name: Nimesh Se 10MG TABS] 90 tablet 3     Sig: TAKE 1 TABLET BY MOUTH ONE TIME A DAY       Last Filled:      Patient Phone Number: [phone] (home)     Last appt: 8/31/2018   Next appt: Visit date not found    Last Labs DM:   Lab Results   Component Value Date    LABA1C 6.3 07/15/2020

## 2020-10-28 ENCOUNTER — OFFICE VISIT (OUTPATIENT)
Dept: ENDOCRINOLOGY | Age: 70
End: 2020-10-28
Payer: COMMERCIAL

## 2020-10-28 VITALS
BODY MASS INDEX: 32.17 KG/M2 | HEART RATE: 73 BPM | SYSTOLIC BLOOD PRESSURE: 137 MMHG | WEIGHT: 217.2 LBS | DIASTOLIC BLOOD PRESSURE: 71 MMHG | HEIGHT: 69 IN | OXYGEN SATURATION: 95 %

## 2020-10-28 LAB — HBA1C MFR BLD: 7.3 %

## 2020-10-28 PROCEDURE — G8417 CALC BMI ABV UP PARAM F/U: HCPCS | Performed by: INTERNAL MEDICINE

## 2020-10-28 PROCEDURE — 3017F COLORECTAL CA SCREEN DOC REV: CPT | Performed by: INTERNAL MEDICINE

## 2020-10-28 PROCEDURE — 99214 OFFICE O/P EST MOD 30 MIN: CPT | Performed by: INTERNAL MEDICINE

## 2020-10-28 PROCEDURE — 2022F DILAT RTA XM EVC RTNOPTHY: CPT | Performed by: INTERNAL MEDICINE

## 2020-10-28 PROCEDURE — G8484 FLU IMMUNIZE NO ADMIN: HCPCS | Performed by: INTERNAL MEDICINE

## 2020-10-28 PROCEDURE — G8427 DOCREV CUR MEDS BY ELIG CLIN: HCPCS | Performed by: INTERNAL MEDICINE

## 2020-10-28 PROCEDURE — 83036 HEMOGLOBIN GLYCOSYLATED A1C: CPT | Performed by: INTERNAL MEDICINE

## 2020-10-28 PROCEDURE — 1123F ACP DISCUSS/DSCN MKR DOCD: CPT | Performed by: INTERNAL MEDICINE

## 2020-10-28 PROCEDURE — 4040F PNEUMOC VAC/ADMIN/RCVD: CPT | Performed by: INTERNAL MEDICINE

## 2020-10-28 PROCEDURE — 3051F HG A1C>EQUAL 7.0%<8.0%: CPT | Performed by: INTERNAL MEDICINE

## 2020-10-28 PROCEDURE — 1036F TOBACCO NON-USER: CPT | Performed by: INTERNAL MEDICINE

## 2020-10-28 RX ORDER — INSULIN HUMAN 500 [IU]/ML
45-50 INJECTION, SOLUTION SUBCUTANEOUS
Qty: 10 PEN | Refills: 2 | Status: SHIPPED | OUTPATIENT
Start: 2020-10-28 | End: 2021-03-03 | Stop reason: SDUPTHER

## 2020-10-28 NOTE — PROGRESS NOTES
Ching Quezada M.D. Phone: [phone]   FAX: [phone]       Rae Romero   YOB: 1950    Date of Visit:  10/28/2020    No Known Allergies  Outpatient Medications Marked as Taking for the 10/28/20 encounter (Office Visit) with Stephanie Berumen MD   Medication Sig Dispense Refill    JARDIANCE 10 MG tablet TAKE 1 TABLET BY MOUTH ONE TIME A DAY 90 tablet 3    TRUEPLUS PEN NEEDLES 31G X 8 MM MISC USE THREE TIMES DAILY 100 each 5    potassium chloride (KLOR-CON M) 20 MEQ extended release tablet TAKE 1 TABLET BY MOUTH ONE TIME A DAY 90 tablet 2    insulin regular human (HUMULIN R U-500 KWIKPEN) 500 UNIT/ML SOPN concentrated injection pen Inject 40 Units into the skin 3 times daily (before meals) Inject 40 units in AM, 40 units with Lunch and Dinner. (Patient taking differently: Inject 45 Units into the skin 3 times daily (before meals) Inject 40 units in AM, 40 units with Lunch and Dinner.) 30 mL 2    chlorthalidone (HYGROTON) 25 MG tablet TAKE 1 TABLET BY MOUTH ONE TIME A DAY 90 tablet 3    atorvastatin (LIPITOR) 80 MG tablet TAKE 1 TABLET BY MOUTH ONCE DAILY 90 tablet 3    carvedilol (COREG) 25 MG tablet TAKE ONE TABLET BY MOUTH TWICE A DAY WITH MEALS 180 tablet 4    lisinopril (PRINIVIL;ZESTRIL) 40 MG tablet TAKE ONE TABLET BY MOUTH ONE TIME A DAY 90 tablet 3    Blood Glucose Monitoring Suppl (PRODIGY AUTOCODE BLOOD GLUCOSE) w/Device KIT Use to test blood glucose 4 times per day 1 kit 0    PRODIGY LANCETS 28G MISC 1 each by Does not apply route 4 times daily (after meals and at bedtime) 400 each 3    Omega-3 Fatty Acids (FISH OIL) 1000 MG CAPS Take 3,000 mg by mouth 2 times daily       aspirin 81 MG tablet Take 81 mg by mouth daily.  Multiple Vitamins-Minerals (MULTIVITAMIN PO) Take  by mouth daily.            Vitals:    10/28/20 1030   BP: 137/71   Site: Right Upper Arm   Position: Sitting   Cuff Size: Medium Adult   Pulse: 73   SpO2: 95%   Weight: 217 lb 3.2 known nephropathy . Has Peripheral neuropathy with numbness in both feet. Has h/o CAD. S/p MI and stent in 2006. Home regimen: On humulin R U-500 ( since 12/13). Current dose is  45 units TID    Continue jardiance 10 mg daily. Previous medications: Metformin. Levemir insulin 65  units QHS. Novolog 40 units TID with meals + SSI    BS pattern. Checks BS 4 times a day  -180    Hypoglycemia. 1-2 times a month. fasting    Diet: Eats 3 meals/day at regular times . Had nutrition education in 2005. Exercise: Walks 30-45 mins 2 days/week. No nausea, vomiting  No polyuria , polydipsia. Hyperlipidemia: takes Lipitor 80 mg daily, lopid 600 mg BID and fish oil . Taking all meds regulalrly. No muscle aches or pains. Hypertension: Lisinopril 40 mg , chlorthalidone 25 mg daily and coreg 25 mg BID. Takes BP regularly. No dizziness, headache  No chest pain, shortness of breath. Review of Systems   Constitutional: Positive for malaise/fatigue. Negative for weight loss. HENT: Negative for sore throat. Eyes: Negative for blurred vision. Respiratory: Negative for cough and shortness of breath. Cardiovascular: Negative for chest pain and palpitations. Gastrointestinal: Negative for heartburn, nausea, vomiting and abdominal pain. Genitourinary: Negative for urgency and frequency. Musculoskeletal: Positive for joint pain. Negative for myalgias and back pain. Skin: Negative for rash. Neurological: Positive for tingling. Negative for sensory change and headaches. Endo/Heme/Allergies: Negative for polydipsia. Psychiatric/Behavioral: Negative for depression. The patient is not nervous/anxious. Assessment/Plan    1. Type 2 DM    This 78 y.o. male has Type 2 DM with obesity and insulin resistance. DM is complicated by retinopathy.      A1c 10.8 %--->8.8 %--- > 8.1 %--->6.6 %.--> 6.8 %---> 6.8 % ---> 6.5 %---> 6.7 % --->7.9 % ---> 6.5 %--->6.6 % ---> 6.8 % --> 6.8 % ---> 6.6 % ---> 7.3 % ---> 6.5 % ---> 7.1 % --> 6.8 % --->6.3 % --->  7.3 %       -BS control.     -Change the dose of Humulin R U-500 to 45-50 units three times a day. Eat snack at bedtime  -Continue jardiance 10 mg daily. Check BS 4 times a day. Continue follow-up with the ophthalmologist for retinopathy every 6 months. Urine microalbumin/cr slightly high in 03/16,  04/17., 11/8 , 03/19 On lisinopril. Has peripheral neuropathy on exam. Discussed foot care. 2. Hypertension. Continue same medications. 3. Hyperlipidemia. On fibrates, statins and fish oil. TGD have improved significantly with weight loss and treatment. Triglycerides 824---->230--->211---> 111---> 255---> 211---> 197---> 96-----> 76---> 156                                LDL 57---> 59---> 71---> 66                               HDL 26--> 30---> 34--> 31    4. CAD s/p MI. On ASA/Plavix. Follows with cards.         RTC 4 months to see Jermaine Cohen CNP

## 2020-11-12 NOTE — PROGRESS NOTES
[facility]) Dermatology  Anjum Pinto MD  [phone]      Anastasiia Taylor  1950    78 y.o. male     Date of Visit: 11/16/2020    Last Visit: 1yr    Chief Complaint: Skin check    History of Present Illness:  1. Here for skin check. History of actinic keratoses s/p cryotherapy.   -Has been vigilant about brimmed hats, SPF 30+ sunscreen   -Unsure of new lesions     2. Unknown duration asymptomatic lesion of R neck    Derm History:   -s/p epidermoid cyst excision 6/2019    Review of Systems:  Constitutional: Reports general sense of well-being. Skin: No interval severe sunburns. Allergies: Reviewed and updated. Past Medical History, Surgical History, Medications and Allergies reviewed.      Past Medical History:   Diagnosis Date    CAD (coronary artery disease)     Heart attack ([facility] [address] 3/2006    Hyperlipidemia     Hypertension     PONV (postoperative nausea and vomiting)     Type II or unspecified type diabetes mellitus without mention of complication, not stated as uncontrolled      Past Surgical History:   Procedure Laterality Date    CARDIAC SURGERY  2006    PTCA, stent LAD, Dr Minh Marte Right 09/2020    ELBOW SURGERY Right 1997    tennis   [address] TEAR/HOLE  08/2020   Bertell Halim SHOULDER SURGERY  2008    right rotator cuff tear, [place]    SHOULDER SURGERY Left 1/2015    TONSILLECTOMY  1979       No Known Allergies  Outpatient Medications Marked as Taking for the 11/16/20 encounter (Office Visit) with Anjum Pinto MD   Medication Sig Dispense Refill    insulin regular human (HUMULIN R U-500 KWIKPEN) 500 UNIT/ML SOPN concentrated injection pen Inject 45-50 Units into the skin 3 times daily (before meals) 10 pen 2    JARDIANCE 10 MG tablet TAKE 1 TABLET BY MOUTH ONE TIME A DAY 90 tablet 3    TRUEPLUS PEN NEEDLES 31G X 8 MM MISC USE THREE TIMES DAILY 100 each 5    potassium chloride (KLOR-CON M) 20 MEQ extended release tablet TAKE 1 TABLET BY MOUTH ONE TIME A DAY 90 tablet 2    chlorthalidone (HYGROTON) 25 MG tablet TAKE 1 TABLET BY MOUTH ONE TIME A DAY 90 tablet 3    atorvastatin (LIPITOR) 80 MG tablet TAKE 1 TABLET BY MOUTH ONCE DAILY 90 tablet 3    carvedilol (COREG) 25 MG tablet TAKE ONE TABLET BY MOUTH TWICE A DAY WITH MEALS 180 tablet 4    lisinopril (PRINIVIL;ZESTRIL) 40 MG tablet TAKE ONE TABLET BY MOUTH ONE TIME A DAY 90 tablet 3    Blood Glucose Monitoring Suppl (PRODIGY AUTOCODE BLOOD GLUCOSE) w/Device KIT Use to test blood glucose 4 times per day 1 kit 0    PRODIGY LANCETS 28G MISC 1 each by Does not apply route 4 times daily (after meals and at bedtime) 400 each 3    Omega-3 Fatty Acids (FISH OIL) 1000 MG CAPS Take 3,000 mg by mouth 2 times daily       aspirin 81 MG tablet Take 81 mg by mouth daily.  Multiple Vitamins-Minerals (MULTIVITAMIN PO) Take  by mouth daily. Social History: Retired IT for [employer]. Wife Kevin Oconnor is pt here. Physical Examination     The following were examined and determined to be normal: Psych/Neuro, Conjunctivae/eyelids, Gums/teeth/lips, Breast/axilla/chest, Abdomen, Back, RUE, LUE, RLE, LLE, Nails/digits and Genitalia/groin/buttocks. The following were examined and determined to be abnormal: Scalp/hair and Head/face. Neck     -General: Well-appearing, NAD  1. Vertex scalp 1, R 1 and L 1 temples, nasal bridge 1, R 1 and L 1 upper forehead - ill-defined irregularly-shaped roughly-scaling thin pink macules/papules   2. R neck - 1.1cm round asymmetric dark pink and light brown patch       Assessment and Plan     1. Actinic keratosis(es)  -Edu re: relationship with chronic cumulative sun exposure, low premalignant potential.   -6 lesion(s) treated w/ liquid nitrogen x 2 cycles - Vertex scalp 1, R 1 and L 1 temples, nasal bridge 1, R 1 and L 1 upper forehead. Edu re: risk of blister formation, discomfort, scar, hypopigmentation. Discussed wound care.     -Reviewed sun protective behavior -- sun avoidance during the peak hours of the day, sun-protective clothing (including hat and sunglasses), sunscreen use (water resistant, broad spectrum, SPF at least 30, need for reapplication every 2 to 3 hours). -Return for full skin exam in 1 year (sooner if indicated)      2. Neoplasm of uncertain behavior of skin - R/o lentigo, R neck   -Discussed possible diagnosis. Patient agreeable to biopsy. Verbal consent obtained after risks (infection, bleeding, scar), benefits and alternatives explained. -Area(s) to be biopsied were marked with a surgical pen. Site(s) were cleansed with alcohol. Local anesthesia achieved with 1% lidocaine with epinephrine/sodium bicarbonate. Shave biopsy performed with a razor blade. Hemostasis was achieved with aluminum chloride. The wound(s) were dressed with petrolatum and covered with a bandage. Specimen(s) sent to pathology. Pt educated re: risk of bleeding, infection, scar and wound care instructions.

## 2020-11-12 NOTE — PATIENT INSTRUCTIONS
Protecting Yourself From the Sun    · Apply broad spectrum water resistant sunscreen with an SPF of at least 30 to exposed areas of the skin. Dont forget the ears and lips! Remember to reapply sunscreen about every 2 hours and after swimming or sweating. · Wear sun protective clothing. Swim shirts (aka. rash guards) are a great idea and negates the need to reapply sunscreen in those areas. · Seek the shade whenever possible especially between the hours of 10 am and 4 pm when the suns rays are the strongest.     · Avoid tanning beds       Cryosurgery (Freezing) Wound Care Instructions    AFTER THE PROCEDURE:    You will notice swelling and redness around the site. This is normal.    You may experience a sharp or sore feeling for the next several days. For this discomfort, you may take acetaminophen (Tylenol©).  A blister may develop at the treated area, sometimes as soon as by the end of the day. After several days, the blister will subside and a scab will form.  If the area is bumped or traumatized during the first few days following freezing, you may develop bleeding into the blister, forming a blood blister. This is nothing to be alarmed about.  If the blister is tense, uncomfortable, or much larger than the site that was frozen, you may pop the blister along its edge with a sterile needle (boiled, heated under a flame, or cleaned with alcohol) to allow the fluid to drain out. If the blister does not bother you, no treatment is needed.  Do NOT peel off the top of the blister roof. It will act as a dressing on top of your wound. WOUND CARE:    You may shower or bathe as usual, but avoid scrubbing the areas that have been frozen.  Cleanse the site twice a day with mild soapy water, and then apply a thin film of white petrolatum (Vaseline©).  You do not need to cover the area, but can if you prefer.     Do NOT allow the site to become dry or crusted, or attempt to dry it out with rubbing alcohol or hydrogen peroxide.  Continue this regimen until the area is pink and healed. Depending on the size and location of your cryosurgery site, healing may take 2 to 4 weeks.  The area may continue to be pink for several weeks, and over the next few months may become darker or lighter than the surrounding skin. This may be a permanent change. Biopsy Wound Care Instructions    · Keep the bandage in place for 24 hours. · Cleanse the wound with mild soapy water daily   Gently dry the area.  Apply Vaseline or petroleum jelly to the wound using a cotton tipped applicator.  Cover with a clean bandage.  Repeat this process until the biopsy site is healed.  If you had stitches placed, continue treating the site until the stitches are removed. Remember to make an appointment to return to have your stitches removed by our staff.  You may shower and bathe as usual.       ** Biopsy results generally take around 7 business days to come back. If you have not heard from us by then, please call the office at [phone] between 8AM and 4PM Monday through Friday.

## 2020-11-16 ENCOUNTER — OFFICE VISIT (OUTPATIENT)
Dept: DERMATOLOGY | Age: 70
End: 2020-11-16
Payer: COMMERCIAL

## 2020-11-16 VITALS — TEMPERATURE: 97.4 F

## 2020-11-16 PROCEDURE — 99213 OFFICE O/P EST LOW 20 MIN: CPT | Performed by: DERMATOLOGY

## 2020-11-16 PROCEDURE — G8484 FLU IMMUNIZE NO ADMIN: HCPCS | Performed by: DERMATOLOGY

## 2020-11-16 PROCEDURE — 1123F ACP DISCUSS/DSCN MKR DOCD: CPT | Performed by: DERMATOLOGY

## 2020-11-16 PROCEDURE — G8427 DOCREV CUR MEDS BY ELIG CLIN: HCPCS | Performed by: DERMATOLOGY

## 2020-11-16 PROCEDURE — 17003 DESTRUCT PREMALG LES 2-14: CPT | Performed by: DERMATOLOGY

## 2020-11-16 PROCEDURE — 11102 TANGNTL BX SKIN SINGLE LES: CPT | Performed by: DERMATOLOGY

## 2020-11-16 PROCEDURE — 17000 DESTRUCT PREMALG LESION: CPT | Performed by: DERMATOLOGY

## 2020-11-16 PROCEDURE — 4040F PNEUMOC VAC/ADMIN/RCVD: CPT | Performed by: DERMATOLOGY

## 2020-11-16 PROCEDURE — 3017F COLORECTAL CA SCREEN DOC REV: CPT | Performed by: DERMATOLOGY

## 2020-11-16 PROCEDURE — 1036F TOBACCO NON-USER: CPT | Performed by: DERMATOLOGY

## 2020-11-16 PROCEDURE — G8417 CALC BMI ABV UP PARAM F/U: HCPCS | Performed by: DERMATOLOGY

## 2020-11-18 LAB — DERMATOLOGY PATHOLOGY REPORT: NORMAL

## 2020-11-19 ENCOUNTER — TELEPHONE (OUTPATIENT)
Dept: PHARMACY | Facility: CLINIC | Age: 70
End: 2020-11-19

## 2020-11-19 NOTE — TELEPHONE ENCOUNTER
Pharmacy Pop Care Documentation:   Called patient with reminder for requirements for Diabetes Management Program.     According to our records, patient is missing the following requirement(s) that must be completed by December 31, 2020:     Yearly flu shot (for 2008-6869 season)     Spoke to patient at home/cell number and advised them of the above information. Patient verified understanding. Per patient he has not had his vaccination yet but is planning on getting it soon. Patient requested a [email or web address] message with our contact information so that he can submit the documentation. [email or web address] message sent.      Ashwini Lomax [facility]   Department, toll free: [phone], option 7

## 2021-01-04 NOTE — TELEPHONE ENCOUNTER
LOV 11/2019   No scheduled visit. I called him to schedule. No answer. Left VM to call the office to schedule and then will send refills.

## 2021-01-05 RX ORDER — LISINOPRIL 40 MG/1
TABLET ORAL
Qty: 90 TABLET | Refills: 3 | Status: SHIPPED | OUTPATIENT
Start: 2021-01-05 | End: 2021-04-07 | Stop reason: SDUPTHER

## 2021-01-06 ENCOUNTER — TELEPHONE (OUTPATIENT)
Dept: PHARMACY | Facility: CLINIC | Age: 71
End: 2021-01-06

## 2021-01-06 NOTE — TELEPHONE ENCOUNTER
Called patient to schedule pharmacist appointment to discuss medications for Diabetes Management Program.     Scheduled 11/13 at 11:30AM    Carmen George CP.   Pharmacy [facility]  Department, toll free: [phone], option 7

## 2021-01-12 NOTE — TELEPHONE ENCOUNTER
[facility] Diabetes Program - Be Well With Diabetes  =================================================================  Milla Rocha is a 78 y.o. male enrolled in the [facility] Employee Diabetes Program. Patient provided [address] with verbal consent to remain in the program for this year. Patient enrolled 2018. Sees sangita Phillip- this doctor is leaving- this office does have NP now and he was told he can continue at this office    Medications:  Medication Sig    lisinopril (PRINIVIL;ZESTRIL) 40 MG tablet TAKE 1 TABLET BY MOUTH ONE TIME A DAY  -filled 3/24/20, 6/30/20, 10/2/20, 1/7/2021    insulin regular human (HUMULIN R U-500 KWIKPEN) 500 UNIT/ML SOPN concentrated injection pen Inject 45-50 Units into the skin 3 times daily (before meals)  - filled 3/25/20 24 ml, 6/23/20 24 ml, 9/16/20 24 ml, 11/23/20 30 ml    JARDIANCE 10 MG tablet TAKE 1 TABLET BY MOUTH ONE TIME A DAY  -filled 3/24/20, 6/18/20, 9/18/20, 12/20/20    TRUEPLUS PEN NEEDLES 31G X 8 MM MISC USE THREE TIMES DAILY    potassium chloride (KLOR-CON M) 20 MEQ extended release tablet TAKE 1 TABLET BY MOUTH ONE TIME A DAY    chlorthalidone (HYGROTON) 25 MG tablet TAKE 1 TABLET BY MOUTH ONE TIME A DAY    atorvastatin (LIPITOR) 80 MG tablet TAKE 1 TABLET BY MOUTH ONCE DAILY  -filled 4/21/20, 7/28/20, 11/9/20    carvedilol (COREG) 25 MG tablet TAKE ONE TABLET BY MOUTH TWICE A DAY WITH MEALS    Blood Glucose Monitoring Suppl (PRODIGY AUTOCODE BLOOD GLUCOSE) w/Device KIT Use to test blood glucose 4 times per day    PRODIGY LANCETS 28G MISC 1 each by Does not apply route 4 times daily (after meals and at bedtime)    Omega-3 Fatty Acids (FISH OIL) 1000 MG CAPS Take 3,000 mg by mouth 2 times daily     aspirin 81 MG tablet Take 81 mg by mouth daily.  Multiple Vitamins-Minerals (MULTIVITAMIN PO) Take  by mouth daily.       Current Pharmacy: [facility] home delivery  Current testing supplies/frequency: prodigy, Testing 3-4 times per day  Pen needles/syringes: has supply    Allergies:  No Known Allergies   Vitals/Labs:  BP Readings from Last 3 Encounters:   10/28/20 137/71   20 130/74   20 122/68     Lab Results   Component Value Date    LABMICR 3.00 (H) 2019     Lab Results   Component Value Date    LABA1C 7.3 10/28/2020    LABA1C 6.3 07/15/2020    LABA1C 6.4 2019     Lab Results   Component Value Date    CHOL 113 07/15/2020    TRIG 220 (H) 07/15/2020    HDL 28 (L) 07/15/2020    LDLCALC 41 07/15/2020    LDLDIRECT 50 2019     ALT   Date Value Ref Range Status   07/15/2020 30 10 - 40 U/L Final     AST   Date Value Ref Range Status   07/15/2020 22 15 - 37 U/L Final     The ASCVD Risk score (Yolette Harman, et al., 2013) failed to calculate for the following reasons: The valid total cholesterol range is 130 to 320 mg/dL     Has h/o CAD. S/p MI and stent in     Lab Results   Component Value Date    CREATININE 0.8 07/15/2020     CrCl cannot be calculated (Patient's most recent lab result is older than the maximum 120 days allowed. ).     eGFR: > 60 mL/min/1.73 m^2    Immunizations:  Immunization History   Administered Date(s) Administered    Influenza Virus Vaccine 2008, 2012    Influenza, High Dose (Fluzone 65 yrs and older) 2017, 2018, 2020    Influenza, Intradermal, Preservative free 10/30/2013, 2014    Influenza, Intradermal, Quadrivalent, Preservative Free 10/10/2016    Influenza, Triv, inactivated, subunit, adjuvanted, IM (Fluad 65 yrs and older) 2019    Pneumococcal Conjugate 13-valent ([id number]) 2017    Pneumococcal Polysaccharide ([id number]) 2007, 2018    Tdap (Boostrix, Adacel) 2014      Social History:  Social History     Tobacco Use    Smoking status: Former Smoker     Start date: 1967     Quit date: 1985     Years since quittin.0    Smokeless tobacco: Never Used    Tobacco comment: stopped smoking over 20 years ago   Substance Use Topics    Alcohol use: Yes     Alcohol/week: 0.0 standard drinks     Comment: 1 drink per week     ASSESSMENT:  Initial Program Requirements (Y indicates has completed for the year, N indicates needs to be completed by 7/1/2021): No - OV with provider for DM (1st)  No - A1c (1st)     Ongoing Program Requirements (Y indicates has completed for the year, N indicates needs to be completed by 12/31/2021): No - OV with provider for DM (2nd)  No - ACC/diabetes educator visit (if A1c over 8%)  No - A1c (2nd)  No - Lipid panel  No - Urine microalbumin  Yes - Pneumococcal vaccination: up to date    No - Influenza vaccination for Fall 2021  No - Medication adherence over 70%  Yes - On statin - atorvastatin 80 mg (MI 2006)  Yes - On ACEi/ARB - lisinopril     Current medications eligible for copay waiver, up to $600, through [employer] [place]:  - chlorthalidone, Humulin U500, Jardiance, atorvastatin, lisinopril, glucagon  - Generic ([facility] pharmacy-stocked) insulin syringes and pen needles  - Agamatrix or Prodigy meter and supplies  - Dexcom G6 supplies     Diabetes Care:   - Glycemic Goal: <7.0% and directed by provider. Is not at blood glucose goal but is on insulin U500 therapy. - Appropriateness of Insulin Therapy: see endo to adjust insulin  - Therapy Optimization: consider increasing jardiance if needed     - Adherent to ACEi/ARB and/or statin: yes    Type 2 DM under fair control as evidenced by 7.3.  - Current symptoms/problems include neuropathy  - Home blood sugar records:  fasting range: 140-145, was checking 1 hr after meals- educated to check 2 hrs after meal  - Any episodes of hypoglycemia? yes - sometimes in middle of the night but not often  - Known diabetic complications: nephropathy, retinopathy and cardiovascular disease   Has diabetic  Retinopathy s/p laser 11/16. (Last eye exam: 06/18. 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  Follows with JOSEPH every 3-6 months ), Has Peripheral neuropathy with numbness in both feet. 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 Yes  - Medication compliance: compliant all of the time  - Diet compliance: compliant most of the time. Has lost 40 pounds in last years. Does have a sweet tooth- with candy- jelly beans  - Current exercise: no regular exercise- was going to the gym 5 days a week then covid. But does walk dog and use some bands at home    Other Considerations:  - Blood Pressure Goal: BP less than 140/90 mmHg due to history of DM: Is at blood pressure goal.   - Lipids: high dose atorvastatin. - Smoking status: former    PLAN:  - Consideration(s) for provider:   · Aspirin script  · Need glucagon?- patient interested in nasal  · Increase jardiance if needed  - DM program gaps identified:   · Initial requirements: OV with provider for DM (1st) and A1c (1st)   · Ongoing requirements: OV with provider for DM (2nd), ACC/diabetes educator visit (if A1c over 8%), A1c (2nd), Lipid panel, Urine microalbumin, Influenza vaccination for [id number] and Medication adherence over 70%   - Education to patient: Discussed general issues about diabetes pathophysiology and management.  and Addressed diet and exercise   - Follow up: PCP for identified gaps or as scheduled below  - Upcoming appointments:   Future Appointments   Date Time Provider Estrella Khan   1/13/2021 11:30 AM SCHEDULE, S CLINICAL PHARMACY S Clin Rx None   1/25/2021  8:30 AM MD Dane Zacarias   3/3/2021  9:00 AM ANTON Belcher - CNP AND KRYSTAL Monaco, PharmD, [facility] Clinical Pharmacist  Direct: [phone]  Department: [phone], option 7  ======================================  CLINICAL PHARMACY CONSULT: MED RECONCILIATION/REVIEW ADDENDUM    For Pharmacy Admin Tracking Only    PHSO: No  Total # of Interventions Recommended: 3  - New Order #: 2 New Medication Order Reason(s): Needs Additional Medication Therapy, Cost Conversion  - Updated Order #: 1 Updated Order Reason(s):  Other  Recommended intervention potential cost savings: 1  Total Interventions Accepted: 3  Time Spent (min): [address]

## 2021-01-13 ENCOUNTER — SCHEDULED TELEPHONE ENCOUNTER (OUTPATIENT)
Dept: PHARMACY | Facility: CLINIC | Age: 71
End: 2021-01-13

## 2021-01-13 RX ORDER — GLUCAGON 3 MG/1
POWDER NASAL
Qty: 1 EACH | Refills: 3 | Status: SHIPPED | OUTPATIENT
Start: 2021-01-13

## 2021-01-13 RX ORDER — ASPIRIN 81 MG/1
81 TABLET ORAL DAILY
Qty: 90 TABLET | Refills: 3 | Status: SHIPPED | OUTPATIENT
Start: 2021-01-13

## 2021-01-13 NOTE — TELEPHONE ENCOUNTER
Dr. Jane Beauchamp MD,    Your patient is currently enrolled in the Be Well with Diabetes program. After your patient's recent visit with a [facility]  Clinical Pharmacist, the below were identified as opportunities to assist with their diabetes management:   · Patient endo no longer in practice. Patient has apt with new person in Feb, therefore I am sending scripts to you for this patient  · Aspirin script- if script provided patient can receive for free  · Glucagon script- provided for $0 with script    See pharmacist note dated 1/13/20 for complete details. To remain active in the program, the patient is to meet the requirements and documentation must be provided by pre-established deadlines (see below).        Program Requirements  Initial Program Requirements (to be completed by 07/01/2021):  · Office visit with provider for DM (1st)  · A1c (1st)    Ongoing Program Requirements (to be completed by 12/31/2021):  · Office visit with provider for DM (2nd)  · A1c (2nd)  · Diabetes Education if A1c >8%  · Lipid panel  · Urine microalbumin   · Pneumococcal vaccination (if applicable)  · Influenza vaccination for Fall 2021  · On statin or contraindication  · On ACEi/ARB or contraindication    Thank you,  Cr Colin, PharmD, [facility] Pharmacist  Direct: [phone], Ext [phone]

## 2021-01-14 RX ORDER — BLOOD SUGAR DIAGNOSTIC
STRIP MISCELLANEOUS
Qty: 360 EACH | Refills: 2 | Status: SHIPPED | OUTPATIENT
Start: 2021-01-14

## 2021-01-14 NOTE — TELEPHONE ENCOUNTER
Medication:   Requested Prescriptions     Pending Prescriptions Disp Refills    blood glucose test strips (PRODIGY NO CODING BLOOD GLUC) strip 360 each 2     Sig: USE TO TEST BLOOD GLUCOSE LEVELS 4 TIMES DAILY       Last appt: 10/28/2020  Next appt: 03/03/2021    Last Labs DM:   Lab Results   Component Value Date    LABA1C 7.3 10/28/2020

## 2021-01-22 NOTE — PROGRESS NOTES
[address] Office Note  1/25/2021     Subjective:  Mr. Jed Garrido is 78 y.o. male presents today for cardiology follow up of CAD, HLD and DM, HBP  LAD sent 2006     [place]: Today he reports feeling good. He is taking his medications as prescribed. He states his blood sugar has been under control,  his last A1C was 6.2. Patient denies chest pain, sob, palpitations, dizziness or syncope. He checks his blood pressure at home and it runs good. He denies any pain or sores on his legs or feet. PMH:   Known history of CAD stent LAD 2006 DM HBP Hyperlipidemia    He is on secondary prevention for CAD and Is compliant with therapy. DM - stable - follows with PCP    Review of Systems: 12 point ROS negative in all areas as listed below except as in [address]  Denies edema orthopnea PND or angina. Constitutional, EENT, Cardiovascular, pulmonary, GI, , Musculoskeletal, skin, neurological, hematological, endocrine, Psychiatric    Reviewed past medical history, social, and family history. Nonsmoker no alcohol or recreational drugs  Family history :Reviewed with patient and has no premature coronary artery disease in immediate family.   Past Medical History:   Diagnosis Date    CAD (coronary artery disease)     Heart attack ([address] 3/2006    Hyperlipidemia     Hypertension     PONV (postoperative nausea and vomiting)     Type II or unspecified type diabetes mellitus without mention of complication, not stated as uncontrolled      Past Surgical History:   Procedure Laterality Date    CARDIAC SURGERY  2006    PTCA, stent JAIR, Dr Karen Oliva Right 09/2020    ELBOW SURGERY Right 1997    tennis   [address] [place] [address]  08/2020   [facility] SHOULDER SURGERY  2008    right rotator cuff tear, [place]    SHOULDER SURGERY Left 1/2015    TONSILLECTOMY  1979       Objective:   /62   Pulse 75   Ht 5' 9\" (1.753 m)   Wt 210 lb (95.3 kg)   SpO2 93%   BMI 31.01 kg/m²  JARDIANCE 10 MG tablet TAKE 1 TABLET BY MOUTH ONE TIME A DAY 90 tablet 3    TRUEPLUS PEN NEEDLES 31G X 8 MM MISC USE THREE TIMES DAILY 100 each 5    Blood Glucose Monitoring Suppl (PRODIGY AUTOCODE BLOOD GLUCOSE) w/Device KIT Use to test blood glucose 4 times per day 1 kit 0    PRODIGY LANCETS 28G MISC 1 each by Does not apply route 4 times daily (after meals and at bedtime) 400 each 3    Omega-3 Fatty Acids (FISH OIL) 1000 MG CAPS Take 3,000 mg by mouth 2 times daily       Multiple Vitamins-Minerals (MULTIVITAMIN PO) Take  by mouth daily.  [DISCONTINUED] potassium chloride (KLOR-CON M) 20 MEQ extended release tablet TAKE 1 TABLET BY MOUTH ONE TIME A DAY 90 tablet 2    [DISCONTINUED] chlorthalidone (HYGROTON) 25 MG tablet TAKE 1 TABLET BY MOUTH ONE TIME A DAY 90 tablet 3    [DISCONTINUED] atorvastatin (LIPITOR) 80 MG tablet TAKE 1 TABLET BY MOUTH ONCE DAILY 90 tablet 3    [DISCONTINUED] carvedilol (COREG) 25 MG tablet TAKE ONE TABLET BY MOUTH TWICE A DAY WITH MEALS 180 tablet 4     No facility-administered encounter medications on file as of 1/25/2021.      LABS from April 2018 in [facility] reviewed   LIVER PROFILE: .  LIPID: 1/29/15  Lab Results   Component Value Date    CHOL 113 07/15/2020    CHOL 128 03/06/2019    CHOL 120 04/19/2018         CHOL                    128      3/31/16  Lab Results   Component Value Date    TRIG 220 (H) 07/15/2020    TRIG 156 (H) 03/06/2019    TRIG 76 04/19/2018         TRIG   99      3/31/16   Lab Results   Component Value Date    HDL 28 (L) 07/15/2020    HDL 26 (L) 12/23/2019    HDL 31 (L) 03/06/2019        HDL                        31      3/31/16  Lab Results   Component Value Date    LDLCALC 41 07/15/2020    LDLCALC see below 12/23/2019    LDLCALC 66 03/06/2019        LDL                        77      3/31/16  Lab Results   Component Value Date    LABVLDL 44 07/15/2020    LABVLDL see below 12/23/2019    LABVLDL 31 03/06/2019     A1C:   Lab Results Component Value Date    LABA1C 7.3 10/28/2020     IMAGING:  I have reviewed the following tests and documented in this encounter as follows:   Discussed with patient  EKG 10/30/17   Sinus rhythm  RSR pattern in V1 normal variant    Echo: 14  Conclusions   Summary   There is mild concentric left ventricular hypertrophy. Ejection fraction is visually estimated to be 50-55 %. Possible apical septal hypokinesis. Stage I diastolic dysfunction   The aortic root is mildly dilated. EK/10/14  Shows old septal wall MI    Cath 3/12/06  Showed significant 2 vessel disease 99% occulusion of the LAD with stent placement    Echo: 5/18/10  The left ventricle is hypertrophied with normal cavity size and  global systolic function, estimated ejection fraction is 50-55%. There is anteroseptal and apical hypokinesis. There is stage I  diastolic dysfunction of the left ventricle. The right ventricle is  normal in size and systolic function. There is mild left atrial  enlargement. The right atrium is normal in size. The intraatrial  septum is not well seen, subcostal views are technically suboptimal.  The mitral anulus is calcified with structurally normal mitral  leaflets and trivial mitral regurgitation. The aortic valve is  structurally normal. The tricuspid valve is structurally. The  pulmonic valve is structurally normal. There is no significant  pericardial effusion. The aortic root is borderline dilated. The  vena cava is not well seen. IMPRESSION-   1. Hypertrophied left ventricle with normal global systolic  function, with wall motion abnormalities as above. 2. Stage I diastolic dysfunction of the left ventricle. 3. Mild left atrial enlargement. 4. Mitral anular calcification. 5. Borderline dilated aortic root. Assessment:  Encounter Diagnoses   Name Primary?     Coronary artery disease involving native coronary artery of native heart without angina pectoris Yes    Mixed hyperlipidemia  Essential hypertension        HTN:  Controlled BP today is low normal but runs more in 666'A systolic at home  If BP stays low normal consider reducing chlorthalidone to half the dose. CAD:   no angina or heart failure. No arrhythmia by history or exam.  HLD:  Optimal control Lipids on statin high dose   DM:  Stable followed by PCP      Plan:  1. Continue current medication regimen. We provided him refills. 2. Healthy lifestyle education reviewed including nutrition, exercise and activity  3. Labs -  Lipids, CMP, CBC in spring or early summer    ~we will call you with the results  4. Follow up 1 year         QUALITY MEASURES  1. Tobacco Cessation Counseling: NA  2. Retake of BP if >140/90:   NA  3. Documentation to PCP/referring for new patient:  Sent to PCP at close of office visit  4. CAD patient on anti-platelet: Yes  5. CAD patient on STATIN therapy:  Yes  6. Patient with CHF and aFib on anticoagulation:  NA       This note was scribed in the presence of Brenna Abreu MD by Dayan Petty RN.   I, Dr. Mary Beth Pope, personally performed the services described in this documentation, as scribed by the above signed scribe in my presence. It is both accurate and complete to my knowledge. I agree with the details independently gathered by the clinical support staff, while the remaining scribed note accurately describes my personal service to the patient.           Mary Beth Pope MD 1/25/2021 8:44 AM

## 2021-01-25 ENCOUNTER — OFFICE VISIT (OUTPATIENT)
Dept: CARDIOLOGY CLINIC | Age: 71
End: 2021-01-25
Payer: COMMERCIAL

## 2021-01-25 VITALS
OXYGEN SATURATION: 93 % | BODY MASS INDEX: 31.1 KG/M2 | HEART RATE: 75 BPM | WEIGHT: 210 LBS | SYSTOLIC BLOOD PRESSURE: 100 MMHG | DIASTOLIC BLOOD PRESSURE: 62 MMHG | HEIGHT: 69 IN

## 2021-01-25 DIAGNOSIS — E78.2 MIXED HYPERLIPIDEMIA: ICD-10-CM

## 2021-01-25 DIAGNOSIS — I10 ESSENTIAL HYPERTENSION: Chronic | ICD-10-CM

## 2021-01-25 DIAGNOSIS — I25.10 CORONARY ARTERY DISEASE INVOLVING NATIVE CORONARY ARTERY OF NATIVE HEART WITHOUT ANGINA PECTORIS: Primary | ICD-10-CM

## 2021-01-25 PROCEDURE — 1036F TOBACCO NON-USER: CPT | Performed by: INTERNAL MEDICINE

## 2021-01-25 PROCEDURE — 99214 OFFICE O/P EST MOD 30 MIN: CPT | Performed by: INTERNAL MEDICINE

## 2021-01-25 PROCEDURE — G8427 DOCREV CUR MEDS BY ELIG CLIN: HCPCS | Performed by: INTERNAL MEDICINE

## 2021-01-25 PROCEDURE — G8482 FLU IMMUNIZE ORDER/ADMIN: HCPCS | Performed by: INTERNAL MEDICINE

## 2021-01-25 PROCEDURE — 1123F ACP DISCUSS/DSCN MKR DOCD: CPT | Performed by: INTERNAL MEDICINE

## 2021-01-25 PROCEDURE — 4040F PNEUMOC VAC/ADMIN/RCVD: CPT | Performed by: INTERNAL MEDICINE

## 2021-01-25 PROCEDURE — 3017F COLORECTAL CA SCREEN DOC REV: CPT | Performed by: INTERNAL MEDICINE

## 2021-01-25 PROCEDURE — G8417 CALC BMI ABV UP PARAM F/U: HCPCS | Performed by: INTERNAL MEDICINE

## 2021-01-25 RX ORDER — POTASSIUM CHLORIDE 20 MEQ/1
TABLET, EXTENDED RELEASE ORAL
Qty: 90 TABLET | Refills: 3 | Status: SHIPPED | OUTPATIENT
Start: 2021-01-25 | End: 2021-04-07 | Stop reason: SDUPTHER

## 2021-01-25 RX ORDER — ATORVASTATIN CALCIUM 80 MG/1
TABLET, FILM COATED ORAL
Qty: 90 TABLET | Refills: 3 | Status: SHIPPED | OUTPATIENT
Start: 2021-01-25

## 2021-01-25 RX ORDER — CARVEDILOL 25 MG/1
TABLET ORAL
Qty: 180 TABLET | Refills: 4 | Status: SHIPPED | OUTPATIENT
Start: 2021-01-25

## 2021-01-25 RX ORDER — CHLORTHALIDONE 25 MG/1
TABLET ORAL
Qty: 90 TABLET | Refills: 3 | Status: SHIPPED | OUTPATIENT
Start: 2021-01-25 | End: 2021-04-07 | Stop reason: SDUPTHER

## 2021-01-25 NOTE — LETTER
[facility] Office Note  1/25/2021     Subjective:  Mr. Farrah Givens is 78 y.o. male presents today for cardiology follow up of CAD, HLD and DM, HBP  LAD sent 2006     [place]: Today he reports feeling good. He is taking his medications as prescribed. He states his blood sugar has been under control,  his last A1C was 6.2. Patient denies chest pain, sob, palpitations, dizziness or syncope. He checks his blood pressure at home and it runs good. He denies any pain or sores on his legs or feet. PMH:   Known history of CAD stent LAD 2006 DM HBP Hyperlipidemia    He is on secondary prevention for CAD and Is compliant with therapy. DM - stable - follows with PCP    Review of Systems: 12 point ROS negative in all areas as listed below except as in [address]  Denies edema orthopnea PND or angina. Constitutional, EENT, Cardiovascular, pulmonary, GI, , Musculoskeletal, skin, neurological, hematological, endocrine, Psychiatric    Reviewed past medical history, social, and family history. Nonsmoker no alcohol or recreational drugs  Family history :Reviewed with patient and has no premature coronary artery disease in immediate family.   Past Medical History:   Diagnosis Date    CAD (coronary artery disease)     Heart attack ([address] 3/2006    Hyperlipidemia     Hypertension     PONV (postoperative nausea and vomiting)     Type II or unspecified type diabetes mellitus without mention of complication, not stated as uncontrolled      Past Surgical History:   Procedure Laterality Date    CARDIAC SURGERY  2006    PTCA, stent JAIR, Dr Ayad Dc Right 09/2020    ELBOW SURGERY Right 1997    [address]  08/2020   [facility] SHOULDER SURGERY  2008    right rotator cuff tear, [place]    SHOULDER SURGERY Left 1/2015    TONSILLECTOMY  1979       Objective:   /62   Pulse 75   Ht 5' 9\" (1.753 m)   Wt 210 lb (95.3 kg)   SpO2 93%   BMI 31.01 kg/m² Wt Readings from Last 3 Encounters:   01/25/21 210 lb (95.3 kg)   10/28/20 217 lb 3.2 oz (98.5 kg)   09/22/20 214 lb (97.1 kg)       Physical Exam:  General: No Respiratory distress, appears well developed and well nourished. Eyes:  Sclera nonicteric  Nose/Sinuses:  negative findings: nose shows no deformity, asymmetry, or inflammation, nasal mucosa normal, septum midline with no perforation or bleeding  Back:  no pain to palpation  Joint:  no active joint inflammation  Musculoskeletal:  negative  Skin:  Warm and dry  Neck:  Negative for JVD and Carotid Bruits. Chest:  Clear to auscultation, respiration easy  Cardiovascular:  RRR, S1S2 normal, no murmur, no rub or thrill.   Abdomen:  Soft normal liver and spleen  Extremities:   No edema, clubbing, cyanosis,  Pulses: pedal pulses are normal.  Neuro: intact    Medications:   Outpatient Encounter Medications as of 1/25/2021   Medication Sig Dispense Refill    atorvastatin (LIPITOR) 80 MG tablet TAKE 1 TABLET BY MOUTH ONCE DAILY 90 tablet 3    carvedilol (COREG) 25 MG tablet TAKE ONE TABLET BY MOUTH TWICE A DAY WITH MEALS 180 tablet 4    potassium chloride (KLOR-CON M) 20 MEQ extended release tablet TAKE 1 TABLET BY MOUTH ONE TIME A DAY 90 tablet 3    chlorthalidone (HYGROTON) 25 MG tablet TAKE 1 TABLET BY MOUTH ONE TIME A DAY 90 tablet 3    blood glucose test strips (PRODIGY NO CODING BLOOD GLUC) strip USE TO TEST BLOOD GLUCOSE LEVELS 4 TIMES DAILY 360 each 2    aspirin EC 81 MG EC tablet Take 1 tablet by mouth daily 90 tablet 3    Glucagon (BAQSIMI ONE PACK) 3 MG/DOSE POWD Use as per administration instructions as needed for low blood sugar 1 each 3    lisinopril (PRINIVIL;ZESTRIL) 40 MG tablet TAKE 1 TABLET BY MOUTH ONE TIME A DAY 90 tablet 3    insulin regular human (HUMULIN R U-500 KWIKPEN) 500 UNIT/ML SOPN concentrated injection pen Inject 45-50 Units into the skin 3 times daily (before meals) 10 pen 2 Component Value Date    LABA1C 7.3 10/28/2020     IMAGING:  I have reviewed the following tests and documented in this encounter as follows:   Discussed with patient  EKG 10/30/17   Sinus rhythm  RSR pattern in V1 normal variant    Echo: 14  Conclusions   Summary   There is mild concentric left ventricular hypertrophy. Ejection fraction is visually estimated to be 50-55 %. Possible apical septal hypokinesis. Stage I diastolic dysfunction   The aortic root is mildly dilated. EK/10/14  Shows old septal wall MI    Cath 3/12/06  Showed significant 2 vessel disease 99% occulusion of the LAD with stent placement    Echo: 5/18/10  The left ventricle is hypertrophied with normal cavity size and  global systolic function, estimated ejection fraction is 50-55%. There is anteroseptal and apical hypokinesis. There is stage I  diastolic dysfunction of the left ventricle. The right ventricle is  normal in size and systolic function. There is mild left atrial  enlargement. The right atrium is normal in size. The intraatrial  septum is not well seen, subcostal views are technically suboptimal.  The mitral anulus is calcified with structurally normal mitral  leaflets and trivial mitral regurgitation. The aortic valve is  structurally normal. The tricuspid valve is structurally. The  pulmonic valve is structurally normal. There is no significant  pericardial effusion. The aortic root is borderline dilated. The  vena cava is not well seen. IMPRESSION-   1. Hypertrophied left ventricle with normal global systolic  function, with wall motion abnormalities as above. 2. Stage I diastolic dysfunction of the left ventricle. 3. Mild left atrial enlargement. 4. Mitral anular calcification. 5. Borderline dilated aortic root. Assessment:  Encounter Diagnoses   Name Primary?     Coronary artery disease involving native coronary artery of native heart without angina pectoris Yes    Mixed hyperlipidemia  Essential hypertension        HTN:  Controlled BP today is low normal but runs more in 701'U systolic at home  If BP stays low normal consider reducing chlorthalidone to half the dose. CAD:   no angina or heart failure. No arrhythmia by history or exam.  HLD:  Optimal control Lipids on statin high dose   DM:  Stable followed by PCP      Plan:  1. Continue current medication regimen. We provided him refills. 2. Healthy lifestyle education reviewed including nutrition, exercise and activity  3. Labs -  Lipids, CMP, CBC in spring or early summer    ~we will call you with the results  4. Follow up 1 year         QUALITY MEASURES  1. Tobacco Cessation Counseling: NA  2. Retake of BP if >140/90:   NA  3. Documentation to PCP/referring for new patient:  Sent to PCP at close of office visit  4. CAD patient on anti-platelet: Yes  5. CAD patient on STATIN therapy:  Yes  6. Patient with CHF and aFib on anticoagulation:  NA       This note was scribed in the presence of Lilibeth Louise MD by Minda Diaz RN.   I, Dr. Cami Lee, personally performed the services described in this documentation, as scribed by the above signed scribe in my presence. It is both accurate and complete to my knowledge. I agree with the details independently gathered by the clinical support staff, while the remaining scribed note accurately describes my personal service to the patient.           Cami Lee MD 1/25/2021 8:44 AM

## 2021-01-25 NOTE — PATIENT INSTRUCTIONS
Plan:  1. Continue current medication regimen. 2. Healthy lifestyle education reviewed including nutrition, exercise and activity  3. Labs -  Lipids, CMP, CBC   ~we will call you with the results  4.  Follow up 1 year

## 2021-03-01 NOTE — TELEPHONE ENCOUNTER
Medication:   Requested Prescriptions     Pending Prescriptions Disp Refills    TRUEPLUS PEN NEEDLES 31G X 8 MM [address] [facility] [Pharmacy Med Name: LEADER PEN NEEDLE 31G X 8MM] 100 each 5     Sig: USE THREE TIMES A DAY       Last appt: 10/28/2020   Next appt: 3/3/2021    Last Labs DM:   Lab Results   Component Value Date    LABA1C 7.3 10/28/2020

## 2021-03-02 RX ORDER — PEN NEEDLE, DIABETIC 31 GX5/16"
NEEDLE, DISPOSABLE MISCELLANEOUS
Qty: 100 EACH | Refills: 5 | Status: SHIPPED | OUTPATIENT
Start: 2021-03-02 | End: 2021-05-24 | Stop reason: SDUPTHER

## 2021-03-03 ENCOUNTER — OFFICE VISIT (OUTPATIENT)
Dept: ENDOCRINOLOGY | Age: 71
End: 2021-03-03
Payer: COMMERCIAL

## 2021-03-03 VITALS
WEIGHT: 204 LBS | HEIGHT: 69 IN | DIASTOLIC BLOOD PRESSURE: 64 MMHG | SYSTOLIC BLOOD PRESSURE: 112 MMHG | OXYGEN SATURATION: 95 % | HEART RATE: 74 BPM | BODY MASS INDEX: 30.21 KG/M2

## 2021-03-03 DIAGNOSIS — I10 ESSENTIAL HYPERTENSION: Chronic | ICD-10-CM

## 2021-03-03 DIAGNOSIS — E78.2 MIXED HYPERLIPIDEMIA: ICD-10-CM

## 2021-03-03 LAB — HBA1C MFR BLD: 7.4 %

## 2021-03-03 PROCEDURE — 99214 OFFICE O/P EST MOD 30 MIN: CPT | Performed by: NURSE PRACTITIONER

## 2021-03-03 PROCEDURE — 3051F HG A1C>EQUAL 7.0%<8.0%: CPT | Performed by: NURSE PRACTITIONER

## 2021-03-03 PROCEDURE — 3017F COLORECTAL CA SCREEN DOC REV: CPT | Performed by: NURSE PRACTITIONER

## 2021-03-03 PROCEDURE — 1036F TOBACCO NON-USER: CPT | Performed by: NURSE PRACTITIONER

## 2021-03-03 PROCEDURE — 4040F PNEUMOC VAC/ADMIN/RCVD: CPT | Performed by: NURSE PRACTITIONER

## 2021-03-03 PROCEDURE — 83036 HEMOGLOBIN GLYCOSYLATED A1C: CPT | Performed by: NURSE PRACTITIONER

## 2021-03-03 PROCEDURE — 1123F ACP DISCUSS/DSCN MKR DOCD: CPT | Performed by: NURSE PRACTITIONER

## 2021-03-03 PROCEDURE — G8482 FLU IMMUNIZE ORDER/ADMIN: HCPCS | Performed by: NURSE PRACTITIONER

## 2021-03-03 PROCEDURE — 2022F DILAT RTA XM EVC RTNOPTHY: CPT | Performed by: NURSE PRACTITIONER

## 2021-03-03 PROCEDURE — G8427 DOCREV CUR MEDS BY ELIG CLIN: HCPCS | Performed by: NURSE PRACTITIONER

## 2021-03-03 PROCEDURE — G8417 CALC BMI ABV UP PARAM F/U: HCPCS | Performed by: NURSE PRACTITIONER

## 2021-03-03 RX ORDER — INSULIN HUMAN 500 [IU]/ML
45-50 INJECTION, SOLUTION SUBCUTANEOUS
Qty: 10 PEN | Refills: 2 | Status: SHIPPED | OUTPATIENT
Start: 2021-03-03 | End: 2021-06-10 | Stop reason: SDUPTHER

## 2021-03-03 RX ORDER — LIRAGLUTIDE 6 MG/ML
1.2 INJECTION SUBCUTANEOUS DAILY
Qty: 2 PEN | Refills: 3 | Status: SHIPPED | OUTPATIENT
Start: 2021-03-03 | End: 2021-05-06

## 2021-03-03 ASSESSMENT — ENCOUNTER SYMPTOMS
NAUSEA: 0
EYE PAIN: 0
COLOR CHANGE: 0
CONSTIPATION: 0
DIARRHEA: 0
SHORTNESS OF BREATH: 0

## 2021-03-03 NOTE — PROGRESS NOTES
Endocrinology  [facility], [facility], [address] [facility] [address] [facility], [address]  Phone [phone]  Fax [phone]    Ciarra Hawk is a 78 y.o. male who is a new patient following up for management of Diabetes Mellitus Type 2. PCP ADOLFO PARKS MD    Last A1C:   Lab Results   Component Value Date    LABA1C 7.4 2021    LABA1C 7.3 10/28/2020    LABA1C 6.3 07/15/2020     Last BP Readings:  BP Readings from Last 3 Encounters:   21 112/64   21 100/62   10/28/20 137/71     Last LDL:   Lab Results   Component Value Date    LDLCALC 41 07/15/2020    LDLDIRECT 50 2019     Aspirin Use: 81 mg    Tobacco/Alcohol History:   Tobacco Use    Smoking status: Former Smoker     Start date: 1967     Quit date: 1985     Years since quittin.1    Smokeless tobacco: Never Used    Tobacco comment: stopped smoking over 20 years ago   Substance and Sexual Activity    Alcohol use: Yes     Alcohol/week: 0.0 standard drinks     Comment: 1 drink per week    Drug use: No       PMH includes  Past Medical History:   Diagnosis Date    CAD (coronary artery disease)     Heart attack ([facility] [address] 3/2006    Hyperlipidemia     Hypertension     PONV (postoperative nausea and vomiting)     Type II or unspecified type diabetes mellitus without mention of complication, not stated as uncontrolled         Diabetes:   Ciarra Hawk  was diagnosed with diabetes type 2 almost 15 yrs back.  On insulin: > 5 years   Patient reports that disease course has been variable and is currently well controlled  Current microvascular complications include Has diabetic  Retinopathy s/p laser . (Last eye exam: . 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 Follows with [facility] every 3-6 months )  No known nephropathy . Has Peripheral neuropathy with numbness in both feet.  Current Macrovascular complications:  h/o CAD. S/p MI and stent in .  Cardiologist : Dr. Billie Hubbard Patient reports compliance for about 100 % of the time and adheres to medication, but usually not to diet and exercise instructions.  There have been no recent hospital or ED admissions for hypoglycemia, hyperglycemia or DKA.  Other ED visit include for none  No nausea, vomiting  No polyuria , polydipsia. No dizziness, headache  No chest pain, shortness of breath. Blood glucose trends:  Checks BS 4 times a day  -180   Hypoglycemia. 1-2 times a month. fasting  Hypoglycemia awareness and symptoms:  Has not done CGM    Current Medication regimen: On humulin R U-500 ( since 12/13). Current dose is  45 units TID   Continue jardiance 10 mg daily.      Previous medications: Metformin. Levemir insulin 65  units QHS. Novolog 40 units TID with meals + SSI    GFR > 60    Diabetic Health Maintenance   Diet: Eats 3 meals/day at regular times . Had nutrition education in 2005. Exercise: Walks 30-45 mins 2 days/week. Average carbs per day    Current Exercise: No structured exercise  · Last Eye Exam: < 1 year  · Last Foot exam: at PCP visit  · On ACEI or ARB:Lisinopril 40 mg , chlorthalidone 25 mg daily and coreg 25 mg BID. · On statin: Lipitor 80 mg daily, lopid 600 mg BID and fish oil . Hyperlipidemia: Current complaints include occasional myalgias but otherwise tolerates well. Lab Results   Component Value Date    CHOL 113 07/15/2020    CHOL 128 03/06/2019    CHOL 120 04/19/2018     Lab Results   Component Value Date    TRIG 220 07/15/2020    TRIG 156 03/06/2019    TRIG 76 04/19/2018     Lab Results   Component Value Date    HDL 28 07/15/2020    HDL 26 12/23/2019    HDL 31 03/06/2019    HDL 23 12/28/2010     Lab Results   Component Value Date    LDLCALC 41 07/15/2020    [address] see below 12/23/2019    LDLCALC 66 03/06/2019     Lab Results   Component Value Date    LDLDIRECT 50 12/23/2019    LDLDIRECT 49 10/30/2013    LDLDIRECT 58 06/11/2012     No results found for: ELADIO    Vitamin D deficiency: Currently is on no rx. Current complaints include fatigue on daily basis. Last vitamin Dlevel is:  Lab Results   Component Value Date    VITD25 40.8 03/31/2016       Hypertension  Controlled , denies symptoms of dizziness, light headedness. Occasional dependent edema. Tries to follow a salt restricted diet. Lab Results   Component Value Date     07/15/2020    K 3.8 07/15/2020    CL 99 07/15/2020    CO2 23 07/15/2020    BUN 19 07/15/2020    CREATININE 0.8 07/15/2020    GLUCOSE 141 (H) 07/15/2020    CALCIUM 9.7 07/15/2020    PROT 7.2 07/15/2020    LABALBU 4.8 07/15/2020    BILITOT 0.5 07/15/2020    ALKPHOS 60 07/15/2020    AST 22 07/15/2020    ALT 30 07/15/2020    LABGLOM >60 07/15/2020    GFRAA >60 07/15/2020    AGRATIO 2.0 07/15/2020    GLOB 2.4 07/15/2020     The ASCVD Risk score (Pj Ch et al., 2013) failed to calculate for the following reasons: The valid total cholesterol range is 130 to 320 mg/dL    Family History   Problem Relation Age of Onset    Cancer Mother         pancreas    Cancer Father         esophageal    Diabetes Sister     Diabetes Paternal Grandmother     Diabetes Paternal Grandfather      Review of Systems   Constitutional: Negative for activity change, appetite change, diaphoresis, fever and unexpected weight change. HENT: Negative for dental problem. Eyes: Negative for pain and visual disturbance. Respiratory: Negative for shortness of breath. Cardiovascular: Negative for chest pain, palpitations and leg swelling. Gastrointestinal: Negative for constipation, diarrhea and nausea. Endocrine: Negative for cold intolerance, heat intolerance, polydipsia, polyphagia and polyuria. Genitourinary: Negative for frequency and urgency. Musculoskeletal: Negative for arthralgias, joint swelling and myalgias. Skin: Negative for color change and pallor. Neurological: Negative for weakness, numbness and headaches.    Psychiatric/Behavioral: Negative for dysphoric mood and sleep disturbance. The patient is not nervous/anxious. Vitals:    03/03/21 0853   BP: 112/64   Pulse: 74   SpO2: 95%   Weight: 204 lb (92.5 kg)   Height: 5' 9\" (1.753 m)     Physical Exam  Vitals signs reviewed. Constitutional:       Appearance: He is well-developed. Eyes:      Pupils: Pupils are equal, round, and reactive to light. Neck:      Musculoskeletal: Normal range of motion. Thyroid: No thyromegaly. Cardiovascular:      Rate and Rhythm: Normal rate and regular rhythm. Heart sounds: Normal heart sounds. Pulmonary:      Effort: Pulmonary effort is normal.      Breath sounds: Normal breath sounds. Abdominal:      General: There is no distension. Musculoskeletal: Normal range of motion. Skin:     General: Skin is warm and dry. Comments: No skin changes or evidence of trauma. Neurological:      Mental Status: He is alert and oriented to person, place, and time. Sensory: No sensory deficit. Psychiatric:         Behavior: Behavior normal.         Thought Content: Thought content normal.          Skeletal foot exam:   Toenails are normal.   Pulses are +2 DP bilaterally. Sensory: 10 g monofilament is 10/10 on the right and 10/10 on the left,   128 Hz vibration sense is present bilaterally.     Assessment  Kate Mack is a 78 y.o. male with uncontrolled Diabetes Mellitus type 2 complicated by retinopathy and neuropathy and associated with HTN, HLD, CAD    Plan  Diabetes Mellitus Type 2  Lab Results   Component Value Date    LABA1C 7.4 03/03/2021       Goal A1c  < 6.5%  Medication : trial Victoza, continue other meds  Insulin: U500 Humulin    Carb ratio: 1 unit per 1gm of carb    Sliding scale starts at  120   5 units for every 20 points > 120  120 -140: 5 units  140- 180: 10 units  180 -200: 15 units    Avoid hypoglycemia    Diet :   30-40 grams per meal , 3 meals per day, avoid carbs in snack choices  Include moderate proteins and good fats   Ensure adequate hydration and electrolyte replacement    Exercise :  Recommended exercise is 5-7 days a week for 30-60 mins at least, per day OR a total of 2.5 hours per week , which ever is more feasible. Ambulate as possible     Diabetic Health Maintenance  Follow up with annual eye exams  Follow up with annual podiatry exams if needed  Reviewed sick day management of BG     Other areas of Diabetic Education reviewed:   Carbs: good carbs and bad carbs, importance of carb counting, incorporation of protein with each meal to reduce Glycemic index, importance of portions, Carb/insulin ratio   Fats: Good fats and bad fats, meal planning and supplements.  Discussed how food affects blood sugar readings.  Different diabetic medications   Managing high and low sugar readings   Rotation of sites for subcutaneous medication injection    Mixed Hyperlipidemia  Lab Results   Component Value Date    LDLCALC 41 07/15/2020    [address] [employer] see below 12/23/2019    LDLCALC 66 03/06/2019     Lab Results   Component Value Date    LDLDIRECT 50 12/23/2019    LDLDIRECT 49 10/30/2013    LDLDIRECT 58 06/11/2012     Lab Results   Component Value Date    TRIG 220 07/15/2020    TRIG 156 03/06/2019    TRIG 76 04/19/2018     LDL goal  < 100;   TG elevated at   Continue current regimen  Managed by PCP    Essential Hypertension  Blood pressure controlled. Continue current regimen  Blood pressure elevated at visit. States has not had morning meds. BP low at visit. Is fasting for labs.      Vitamin D deficiency  Lab Results   Component Value Date    VITD25 40.8 03/31/2016     Continue to supplement      Problem List Items Addressed This Visit     DM type 2, uncontrolled, with neuropathy ([address] - Primary (Chronic)    Relevant Medications    Liraglutide (VICTOZA) 18 MG/3ML SOPN SC injection    insulin regular human (HUMULIN R U-500 KWIKPEN) 500 UNIT/ML SOPN concentrated injection pen    Other Relevant Orders    POCT glycosylated hemoglobin (Hb A1C) (Completed)    Hemoglobin A1C    Essential hypertension (Chronic)    Mixed hyperlipidemia          Greater than 40 minutes spent directly counseling patient about topics listed above (such as lifestyle modifications, preventative screenings and/or disease related processes. Return in about 3 months (around 6/3/2021).

## 2021-03-03 NOTE — PATIENT INSTRUCTIONS
Insulin dosing: goal is to reduce to < 100 units daily  Carb ratio: 1 unit per 1gm of carb    Sliding scale starts at  120   5 units for every 20 points > 120  120 -140: 5 units  140- 180: 10 units  180 -200: 15 units      HbA1c  4.0 4.1 4.2 4.3 4.4 4.5 4.6 4.7 4.8 4.9  Glucose 68 71 74 77 80 82 85 88 91 94    HbA1c  5.0 5.1 5.2 5.3 5.4 5.5 5.6 5.7 5.8 5.9  Glucose 97 100 103 105 108 111 114 117 120 123    HbA1c  6.0 6.1 6.2 6.3 6.4 6.5 6.6 6.7 6.8 6.9  Glucose 125 128 131 134 137 140 143 146 148 151    HbA1c  7.0 7.1 7.2 7.3 7.4 7.5 7.6 7.7 7.8 7.9  Glucose 154 157 160 163 166 169 171 174 177 180    HbA1c  8.0 8.1 8.2 8.3 8.4 8.5 8.6 8.7 8.8 8.9  Glucose 183 186 189 192 194 197 200 203 206 209    HbA1c  9.0 9.1 9.2 9.3 9.4 9.5 9.6 9.7 9.8 9.9  Glucose 212 214 217 220 223 226 229 232 235 237    HbA1c  10.0 10.1 10.2 10.3 10.4 10.5 10.6 10.7 10.8 10.9  Glucose 240 243 246 249 252 255 258 260 263 266    HbA1c  11.0 11.1 11.2 11.3 11.4 11.5 11.6 11.7 11.8 11.9  Glucose 269 272 275 278 280 283 286 289 292 295    HbA1c  12.0 12.1 12.2 12.3 12.4 12.5 12.6 12.7 12.8 12.9  Glucose 298 301 303 306 309 312 315 318 321 324    HbA1c  13.0 13.1 13.2 13.3 13.4 13.5 13.6 13.7 13.8 13.9  Glucose 326 329 332 335 338 341 344 346 349 352

## 2021-04-05 ENCOUNTER — PATIENT MESSAGE (OUTPATIENT)
Dept: ENDOCRINOLOGY | Age: 71
End: 2021-04-05

## 2021-04-06 RX ORDER — EMPAGLIFLOZIN 10 MG/1
TABLET, FILM COATED ORAL
Qty: 90 TABLET | Refills: 3 | Status: SHIPPED | OUTPATIENT
Start: 2021-04-06 | End: 2021-06-30 | Stop reason: DRUGHIGH

## 2021-04-06 RX ORDER — EMPAGLIFLOZIN 10 MG/1
TABLET, FILM COATED ORAL
Qty: 90 TABLET | Refills: 3 | Status: CANCELLED | OUTPATIENT
Start: 2021-04-06

## 2021-04-07 RX ORDER — LISINOPRIL 40 MG/1
TABLET ORAL
Qty: 90 TABLET | Refills: 3 | Status: SHIPPED | OUTPATIENT
Start: 2021-04-07

## 2021-04-07 RX ORDER — POTASSIUM CHLORIDE 20 MEQ/1
TABLET, EXTENDED RELEASE ORAL
Qty: 90 TABLET | Refills: 3 | Status: SHIPPED | OUTPATIENT
Start: 2021-04-07

## 2021-04-07 RX ORDER — CHLORTHALIDONE 25 MG/1
TABLET ORAL
Qty: 90 TABLET | Refills: 3 | Status: SHIPPED | OUTPATIENT
Start: 2021-04-07

## 2021-04-07 NOTE — TELEPHONE ENCOUNTER
Last OV 1/25/21 RK  Assessment:       Encounter Diagnoses   Name Primary?  Coronary artery disease involving native coronary artery of native heart without angina pectoris Yes    Mixed hyperlipidemia      Essential hypertension           HTN:  Controlled BP today is low normal but runs more in [address] systolic at home  If BP stays low normal consider reducing chlorthalidone to half the dose. CAD:   no angina or heart failure. No arrhythmia by history or exam.  HLD:  Optimal control Lipids on statin high dose   DM:  Stable followed by PCP        Plan:  1. Continue current medication regimen. We provided him refills. 2. Healthy lifestyle education reviewed including nutrition, exercise and activity  3. Labs -  Lipids, CMP, CBC in spring or early summer               ~we will call you with the results  4.  Follow up 1 year

## 2021-04-07 NOTE — TELEPHONE ENCOUNTER
Refill:  potassium chloride (KLOR-CON M) 20 MEQ extended release tablet     chlorthalidone (HYGROTON) 25 MG tablet    lisinopril (PRINIVIL;ZESTRIL) 40 MG tablet     Send to new pharmacy.     [address] [facility] [phone]

## 2021-05-06 ENCOUNTER — OFFICE VISIT (OUTPATIENT)
Dept: FAMILY MEDICINE CLINIC | Age: 71
End: 2021-05-06
Payer: MEDICARE

## 2021-05-06 VITALS
OXYGEN SATURATION: 97 % | HEART RATE: 73 BPM | HEIGHT: 69 IN | WEIGHT: 207 LBS | TEMPERATURE: 96.1 F | DIASTOLIC BLOOD PRESSURE: 70 MMHG | BODY MASS INDEX: 30.66 KG/M2 | SYSTOLIC BLOOD PRESSURE: 110 MMHG

## 2021-05-06 DIAGNOSIS — R30.0 DYSURIA: ICD-10-CM

## 2021-05-06 DIAGNOSIS — Z12.11 COLON CANCER SCREENING: ICD-10-CM

## 2021-05-06 DIAGNOSIS — H25.012 CORTICAL AGE-RELATED CATARACT OF LEFT EYE: Primary | ICD-10-CM

## 2021-05-06 PROCEDURE — 3017F COLORECTAL CA SCREEN DOC REV: CPT | Performed by: FAMILY MEDICINE

## 2021-05-06 PROCEDURE — 1036F TOBACCO NON-USER: CPT | Performed by: FAMILY MEDICINE

## 2021-05-06 PROCEDURE — G8427 DOCREV CUR MEDS BY ELIG CLIN: HCPCS | Performed by: FAMILY MEDICINE

## 2021-05-06 PROCEDURE — 1123F ACP DISCUSS/DSCN MKR DOCD: CPT | Performed by: FAMILY MEDICINE

## 2021-05-06 PROCEDURE — 99214 OFFICE O/P EST MOD 30 MIN: CPT | Performed by: FAMILY MEDICINE

## 2021-05-06 PROCEDURE — 4040F PNEUMOC VAC/ADMIN/RCVD: CPT | Performed by: FAMILY MEDICINE

## 2021-05-06 PROCEDURE — G8417 CALC BMI ABV UP PARAM F/U: HCPCS | Performed by: FAMILY MEDICINE

## 2021-05-06 ASSESSMENT — PATIENT HEALTH QUESTIONNAIRE - PHQ9: SUM OF ALL RESPONSES TO PHQ QUESTIONS 1-9: 0

## 2021-05-06 NOTE — PROGRESS NOTES
[facility]  [phone]  Fax: [phone]   Pre-operative History and Physical      DIAGNOSIS:  L cataract    PROCEDURE:  L cataract     History Obtained From:  patient    HISTORY OF PRESENT ILLNESS:    The patient is a 73 y.o. male s/p R cataract surg 9/2020. Has hx of b/l retinal artery aneurysms, s/p repair with cautery.       Past Medical History:    Past Medical History:   Diagnosis Date    CAD (coronary artery disease)     Heart attack ([address] 3/2006    Hyperlipidemia     Hypertension     PONV (postoperative nausea and vomiting)     Type II or unspecified type diabetes mellitus without mention of complication, not stated as uncontrolled      Past Surgical History:    Past Surgical History:   Procedure Laterality Date    CARDIAC SURGERY  2006    PTCA, stent LAD, Dr Sarah Simmons Right 09/2020    ELBOW SURGERY Right 1997    tennis   [address] [place] [address] [address]  08/2020    SHOULDER SURGERY  2008    right rotator cuff tear, [place]    SHOULDER SURGERY Left 1/2015    TONSILLECTOMY  1979     Current Medication  Current Outpatient Medications   Medication Sig Dispense Refill    chlorthalidone (HYGROTON) 25 MG tablet TAKE 1 TABLET BY MOUTH ONE TIME A DAY 90 tablet 3    lisinopril (PRINIVIL;ZESTRIL) 40 MG tablet TAKE 1 TABLET BY MOUTH ONE TIME A DAY 90 tablet 3    potassium chloride (KLOR-CON M) 20 MEQ extended release tablet TAKE 1 TABLET BY MOUTH ONE TIME A DAY 90 tablet 3    empagliflozin (JARDIANCE) 10 MG tablet TAKE 1 TABLET BY MOUTH ONE TIME A DAY 90 tablet 3    insulin regular human (HUMULIN R U-500 KWIKPEN) 500 UNIT/ML SOPN concentrated injection pen Inject 45-50 Units into the skin 3 times daily (before meals) 10 pen 2    TRUEPLUS PEN NEEDLES 31G X 8 MM MISC USE THREE TIMES A  each 5    atorvastatin (LIPITOR) 80 MG tablet TAKE 1 TABLET BY MOUTH ONCE DAILY 90 tablet 3    carvedilol (COREG) 25 MG tablet TAKE ONE TABLET BY MOUTH TWICE A DAY WITH MEALS 180 tablet 4    blood glucose test strips (PRODIGY NO CODING BLOOD GLUC) strip USE TO TEST BLOOD GLUCOSE LEVELS 4 TIMES DAILY 360 each 2    aspirin EC 81 MG EC tablet Take 1 tablet by mouth daily 90 tablet 3    Glucagon (BAQSIMI ONE PACK) 3 MG/DOSE POWD Use as per administration instructions as needed for low blood sugar 1 each 3    Blood Glucose Monitoring Suppl (PRODIGY AUTOCODE BLOOD GLUCOSE) w/Device KIT Use to test blood glucose 4 times per day 1 kit 0    PRODIGY LANCETS 28G MISC 1 each by Does not apply route 4 times daily (after meals and at bedtime) 400 each 3    Omega-3 Fatty Acids (FISH OIL) 1000 MG CAPS Take 3,000 mg by mouth 2 times daily       Multiple Vitamins-Minerals (MULTIVITAMIN PO) Take  by mouth daily. No current facility-administered medications for this visit. Allergies:  Patient has no known allergies. History of allergic reaction to anesthesia:  No    Social History:   Social History     Tobacco Use   Smoking Status Former Smoker    Years: 5.00    Start date: 1967   [facility] Quit date: 1985    Years since quittin.3   Smokeless Tobacco Never Used   Tobacco Comment    stopped smoking over 20 years ago     The patient states he drinks 0 alcoholic beverages per week. Family History:   Family History   Problem Relation Age of Onset   [facility] Cancer Mother         pancreas    Cancer Father         esophageal    Diabetes Sister     Diabetes Paternal Grandmother     Diabetes Paternal Grandfather        REVIEW OF SYSTEMS:    CONSTITUTIONAL:  negative  EYES:  negative  HEENT:  negative  RESPIRATORY:  negative  CARDIOVASCULAR:  negative  GASTROINTESTINAL:  negative  GENITOURINARY:  Urine cloudy x 1 mo, on/off. No pain, change in freq.    INTEGUMENT/BREAST:  negative  HEMATOLOGIC/LYMPHATIC:  negative  ALLERGIC/IMMUNOLOGIC:  negative  ENDOCRINE:  negative  MUSCULOSKELETAL:  negative  NEUROLOGICAL:  negative    PHYSICAL EXAM:      /70   Pulse 73   Temp 96.1 °F (35.6 °C) (Temporal) Ht 5' 9\" (1.753 m)   Wt 207 lb (93.9 kg)   SpO2 97%   BMI 30.57 kg/m²     CONSTITUTIONAL:  awake, alert, cooperative, no apparent distress, and appears stated age    Eyes:  Lids and lashes normal, pupils equal, round and reactive to light, extra ocular muscles intact, sclera clear, conjunctiva normal    Head/ENT:  Normocephalic, without obvious abnormality, atramatic, sinuses nontender on palpation, external ears without lesions, oral pharynx with moist mucus membranes, tonsils without erythema or exudates, gums normal and good dentition. Neck:  Supple, symmetrical, trachea midline, no adenopathy, thyroid symmetric, not enlarged and no tenderness, skin normal, No carotid bruit    Heart:  Normal apical impulse, regular rate and rhythm, normal S1 and S2, no S3 or S4, and no murmur noted    Lungs:  No increased work of breathing, good air exchange, clear to auscultation bilaterally, no crackles or wheezing    Abdomen:   normal bowel sounds, soft, non-distended, non-tender, no masses palpated, no hepatosplenomegally    Extremities:  No clubbing, cyanosis, or edema, 2+ pulses    NEUROLOGIC:  Awake, alert, oriented to name, place and time. Cranial nerves II-XII are grossly intact. Motor is 5 out of 5 bilaterally. ASSESSMENT AND PLAN:    1. Patient is a 73 y.o. male cleared for the above specified procedure planned on 5/12/21 with Dr. Charisse Ly at [facility]. 2.  Dysuria. Urine cx and micro eval are ordered though results will not impact surgical clearance.      [facility], M.D    [address] [place] [address], [facility]  [address]  [phone]

## 2021-05-07 LAB
BACTERIA: ABNORMAL /HPF
BILIRUBIN URINE: NEGATIVE
BLOOD, URINE: ABNORMAL
CLARITY: ABNORMAL
COLOR: YELLOW
EPITHELIAL CELLS, UA: 1 /HPF (ref 0–5)
GLUCOSE URINE: >=1000 MG/DL
HYALINE CASTS: 1 /LPF (ref 0–8)
KETONES, URINE: NEGATIVE MG/DL
LEUKOCYTE ESTERASE, URINE: ABNORMAL
MICROSCOPIC EXAMINATION: YES
NITRITE, URINE: POSITIVE
PH UA: 6 (ref 5–8)
PROTEIN UA: NEGATIVE MG/DL
RBC UA: 5 /HPF (ref 0–4)
SPECIFIC GRAVITY UA: 1.02 (ref 1–1.03)
URINE CULTURE, ROUTINE: NORMAL
URINE TYPE: ABNORMAL
UROBILINOGEN, URINE: 0.2 E.U./DL
WBC UA: 195 /HPF (ref 0–5)

## 2021-05-07 RX ORDER — SULFAMETHOXAZOLE AND TRIMETHOPRIM 800; 160 MG/1; MG/1
1 TABLET ORAL 2 TIMES DAILY
Qty: 14 TABLET | Refills: 0 | Status: SHIPPED | OUTPATIENT
Start: 2021-05-07 | End: 2021-05-11

## 2021-05-24 RX ORDER — PEN NEEDLE, DIABETIC 31 GX5/16"
NEEDLE, DISPOSABLE MISCELLANEOUS
Qty: 100 EACH | Refills: 5 | Status: SHIPPED | OUTPATIENT
Start: 2021-05-24

## 2021-06-01 ENCOUNTER — OFFICE VISIT (OUTPATIENT)
Dept: FAMILY MEDICINE CLINIC | Age: 71
End: 2021-06-01
Payer: MEDICARE

## 2021-06-01 VITALS
TEMPERATURE: 97.9 F | WEIGHT: 207.1 LBS | OXYGEN SATURATION: 96 % | DIASTOLIC BLOOD PRESSURE: 60 MMHG | HEART RATE: 82 BPM | BODY MASS INDEX: 30.58 KG/M2 | SYSTOLIC BLOOD PRESSURE: 110 MMHG

## 2021-06-01 DIAGNOSIS — R30.0 DYSURIA: Primary | ICD-10-CM

## 2021-06-01 LAB
BILIRUBIN, POC: ABNORMAL
BLOOD URINE, POC: ABNORMAL
CLARITY, POC: ABNORMAL
COLOR, POC: YELLOW
GLUCOSE URINE, POC: ABNORMAL
KETONES, POC: ABNORMAL
LEUKOCYTE EST, POC: ABNORMAL
NITRITE, POC: POSITIVE
PH, POC: 5.5
PROTEIN, POC: ABNORMAL
SPECIFIC GRAVITY, POC: 1.02
UROBILINOGEN, POC: ABNORMAL

## 2021-06-01 PROCEDURE — 3017F COLORECTAL CA SCREEN DOC REV: CPT | Performed by: NURSE PRACTITIONER

## 2021-06-01 PROCEDURE — 81002 URINALYSIS NONAUTO W/O SCOPE: CPT | Performed by: NURSE PRACTITIONER

## 2021-06-01 PROCEDURE — 1123F ACP DISCUSS/DSCN MKR DOCD: CPT | Performed by: NURSE PRACTITIONER

## 2021-06-01 PROCEDURE — G8417 CALC BMI ABV UP PARAM F/U: HCPCS | Performed by: NURSE PRACTITIONER

## 2021-06-01 PROCEDURE — 99213 OFFICE O/P EST LOW 20 MIN: CPT | Performed by: NURSE PRACTITIONER

## 2021-06-01 PROCEDURE — 1036F TOBACCO NON-USER: CPT | Performed by: NURSE PRACTITIONER

## 2021-06-01 PROCEDURE — 4040F PNEUMOC VAC/ADMIN/RCVD: CPT | Performed by: NURSE PRACTITIONER

## 2021-06-01 PROCEDURE — G8427 DOCREV CUR MEDS BY ELIG CLIN: HCPCS | Performed by: NURSE PRACTITIONER

## 2021-06-01 ASSESSMENT — ENCOUNTER SYMPTOMS
VOMITING: 0
NAUSEA: 0

## 2021-06-01 NOTE — PROGRESS NOTES
Patient: Jaden Tao is a 73 y.o. male who presents today with the following Chief Complaint(s):  Chief Complaint   Patient presents with    Urinary Tract Infection     cloudy, burning, frequent urination, seems better now (since friday) started        Assessment:  Encounter Diagnosis   Name Primary?  Dysuria Yes       Plan:  1. Dysuria  No medications right now. Will send a  Urine cup home with him to bring back a urine sample. Patient was advised to increase his water intake. He verbalized his understanding.   - POCT Urinalysis no Micro  - Culture, Urine      Urinary Tract Infection   This is a new problem. The current episode started in the past 7 days. The problem occurs intermittently. The problem has been gradually improving. The quality of the pain is described as burning. The patient is experiencing no pain. There has been no fever. Associated symptoms include frequency. Pertinent negatives include no flank pain, nausea or vomiting. The treatment provided no relief. There is no history of kidney stones, a single kidney or a urological procedure. last UTI was about 7-8 years ago. Patient reports improvement over the past several days. no burning but still cloudy. He is feeling much better. He does not drink water. He drinks iced tea all day.         Current Outpatient Medications   Medication Sig Dispense Refill    Insulin Pen Needle (TRUEPLUS PEN NEEDLES) 31G X 8 MM MISC USE THREE TIMES A  each 5    chlorthalidone (HYGROTON) 25 MG tablet TAKE 1 TABLET BY MOUTH ONE TIME A DAY 90 tablet 3    lisinopril (PRINIVIL;ZESTRIL) 40 MG tablet TAKE 1 TABLET BY MOUTH ONE TIME A DAY 90 tablet 3    potassium chloride (KLOR-CON M) 20 MEQ extended release tablet TAKE 1 TABLET BY MOUTH ONE TIME A DAY 90 tablet 3    empagliflozin (JARDIANCE) 10 MG tablet TAKE 1 TABLET BY MOUTH ONE TIME A DAY 90 tablet 3    insulin regular human (HUMULIN R U-500 KWIKPEN) 500 UNIT/ML SOPN concentrated injection pen Inject 45-50 Units into the skin 3 times daily (before meals) 10 pen 2    atorvastatin (LIPITOR) 80 MG tablet TAKE 1 TABLET BY MOUTH ONCE DAILY 90 tablet 3    carvedilol (COREG) 25 MG tablet TAKE ONE TABLET BY MOUTH TWICE A DAY WITH MEALS 180 tablet 4    blood glucose test strips (PRODIGY NO CODING BLOOD GLUC) strip USE TO TEST BLOOD GLUCOSE LEVELS 4 TIMES DAILY 360 each 2    aspirin EC 81 MG EC tablet Take 1 tablet by mouth daily 90 tablet 3    Glucagon (BAQSIMI ONE PACK) 3 MG/DOSE POWD Use as per administration instructions as needed for low blood sugar 1 each 3    Blood Glucose Monitoring Suppl (PRODIGY AUTOCODE BLOOD GLUCOSE) w/Device KIT Use to test blood glucose 4 times per day 1 kit 0    PRODIGY LANCETS 28G MISC 1 each by Does not apply route 4 times daily (after meals and at bedtime) 400 each 3    Omega-3 Fatty Acids (FISH OIL) 1000 MG CAPS Take 3,000 mg by mouth 2 times daily       Multiple Vitamins-Minerals (MULTIVITAMIN PO) Take  by mouth daily. No current facility-administered medications for this visit. Patient's past medical history, surgical history, family history, medications,and allergies  were all reviewed and updated as appropriate today. Review of Systems   Constitutional: Negative. HENT: Negative. Cardiovascular: Negative. Gastrointestinal: Negative for nausea and vomiting. Genitourinary: Positive for dysuria and frequency. Negative for flank pain. Neurological: Negative. Physical Exam  Vitals reviewed. Cardiovascular:      Rate and Rhythm: Normal rate and regular rhythm. Heart sounds: Normal heart sounds. Abdominal:      General: Bowel sounds are normal.      Tenderness: There is no abdominal tenderness. Skin:     General: Skin is warm and dry. Neurological:      Mental Status: He is alert and oriented to person, place, and time.        Vitals:    06/01/21 1433   Pulse: 82   Temp: 97.9 °F (36.6 °C)   SpO2: 96%

## 2021-06-04 LAB
ORGANISM: ABNORMAL
URINE CULTURE, ROUTINE: ABNORMAL

## 2021-06-04 RX ORDER — NITROFURANTOIN 25; 75 MG/1; MG/1
100 CAPSULE ORAL 2 TIMES DAILY
Qty: 14 CAPSULE | Refills: 0 | Status: SHIPPED | OUTPATIENT
Start: 2021-06-04 | End: 2021-06-11

## 2021-06-09 ENCOUNTER — PATIENT MESSAGE (OUTPATIENT)
Dept: ENDOCRINOLOGY | Age: 71
End: 2021-06-09

## 2021-06-10 RX ORDER — INSULIN HUMAN 500 [IU]/ML
45-50 INJECTION, SOLUTION SUBCUTANEOUS
Qty: 10 PEN | Refills: 2 | Status: SHIPPED | OUTPATIENT
Start: 2021-06-10

## 2021-06-10 NOTE — TELEPHONE ENCOUNTER
Medication:   Requested Prescriptions     Pending Prescriptions Disp Refills    insulin regular human (HUMULIN R U-500 KWIKPEN) 500 UNIT/ML SOPN concentrated injection pen 10 pen 2     Sig: Inject 45-50 Units into the skin 3 times daily (before meals)         Last appt: 3/3/2021   Next appt: 6/23/2021    Last Labs DM:   Lab Results   Component Value Date    LABA1C 7.4 03/03/2021

## 2021-06-30 ENCOUNTER — VIRTUAL VISIT (OUTPATIENT)
Dept: ENDOCRINOLOGY | Age: 71
End: 2021-06-30
Payer: MEDICARE

## 2021-06-30 DIAGNOSIS — E78.2 MIXED HYPERLIPIDEMIA: ICD-10-CM

## 2021-06-30 DIAGNOSIS — I10 ESSENTIAL HYPERTENSION: Chronic | ICD-10-CM

## 2021-06-30 DIAGNOSIS — I25.10 CORONARY ARTERY DISEASE INVOLVING NATIVE CORONARY ARTERY OF NATIVE HEART WITHOUT ANGINA PECTORIS: ICD-10-CM

## 2021-06-30 DIAGNOSIS — L72.0 EPIDERMAL CYST OF NECK: ICD-10-CM

## 2021-06-30 PROCEDURE — 99214 OFFICE O/P EST MOD 30 MIN: CPT | Performed by: NURSE PRACTITIONER

## 2021-06-30 PROCEDURE — 2022F DILAT RTA XM EVC RTNOPTHY: CPT | Performed by: NURSE PRACTITIONER

## 2021-06-30 PROCEDURE — 4040F PNEUMOC VAC/ADMIN/RCVD: CPT | Performed by: NURSE PRACTITIONER

## 2021-06-30 PROCEDURE — 3017F COLORECTAL CA SCREEN DOC REV: CPT | Performed by: NURSE PRACTITIONER

## 2021-06-30 PROCEDURE — G8427 DOCREV CUR MEDS BY ELIG CLIN: HCPCS | Performed by: NURSE PRACTITIONER

## 2021-06-30 PROCEDURE — 3051F HG A1C>EQUAL 7.0%<8.0%: CPT | Performed by: NURSE PRACTITIONER

## 2021-06-30 PROCEDURE — 1123F ACP DISCUSS/DSCN MKR DOCD: CPT | Performed by: NURSE PRACTITIONER

## 2021-06-30 RX ORDER — EMPAGLIFLOZIN 25 MG/1
25 TABLET, FILM COATED ORAL DAILY
Qty: 30 TABLET | Refills: 3 | Status: SHIPPED | OUTPATIENT
Start: 2021-06-30

## 2021-06-30 ASSESSMENT — ENCOUNTER SYMPTOMS
COLOR CHANGE: 0
NAUSEA: 0
DIARRHEA: 0
SHORTNESS OF BREATH: 0
CONSTIPATION: 0
EYE PAIN: 0

## 2021-06-30 NOTE — PROGRESS NOTES
deficiency  Lab Results   Component Value Date    VITD25 40.8 03/31/2016     Continue to supplement      Problem List Items Addressed This Visit     Coronary artery disease involving native coronary artery of native heart without angina pectoris    DM type 2, uncontrolled, with neuropathy (HCC) - Primary (Chronic)    Relevant Medications    SITagliptin (JANUVIA) 25 MG tablet    empagliflozin (JARDIANCE) 25 MG tablet    Other Relevant Orders    Hemoglobin A1C    Comprehensive Metabolic Panel    Lipid Panel    Microalbumin / Creatinine Urine Ratio    TSH WITH REFLEX TO FT4    RESOLVED: Epidermal cyst of neck    Essential hypertension (Chronic)    Relevant Orders    Comprehensive Metabolic Panel    Mixed hyperlipidemia    Relevant Orders    Lipid Panel       Return in about 3 months (around 9/30/2021).

## 2021-07-01 ENCOUNTER — CLINICAL DOCUMENTATION (OUTPATIENT)
Dept: PHARMACY | Facility: CLINIC | Age: 71
End: 2021-07-01

## 2021-07-01 NOTE — PROGRESS NOTES
Pharmacy Pop Care Documentation:     Rabia Archer is being removed from the diabetes management program for the following reason(s): [facility] [address] [facility]

## 2021-07-01 NOTE — LETTER
[facility]  [address], [place] [address]  Phone: toll free [phone] option 7        Yvette Laura   [address] [facility]   [place] [address]           07/01/21     Dear Toyin Nolasco,    We regret to inform you that you have been disqualified from The [address] [place] [address] With Diabetes Program because the following requirements were not met by the stated deadline:     [address] will not receive the copay waiver up to $600 towards your diabetic medications and supplies in 2021. If you qualify once again, you will be eligible to reapply to The [facility] [place] [facility] the following calendar year. [facility] Team  [phone] Option #7  Email: [email or web address]  Fax Number: [phone]

## 2021-07-17 RX ORDER — INSULIN HUMAN 500 [IU]/ML
45-50 INJECTION, SOLUTION SUBCUTANEOUS
Qty: 10 PEN | Refills: 2 | Status: SHIPPED | OUTPATIENT
Start: 2021-07-17 | End: 2021-11-17 | Stop reason: SDUPTHER

## 2021-07-22 ENCOUNTER — HOSPITAL ENCOUNTER (OUTPATIENT)
Dept: MRI IMAGING | Age: 71
Discharge: HOME | End: 2021-07-22
Payer: MEDICARE

## 2021-07-22 DIAGNOSIS — M75.101 ROTATOR CUFF TEAR ARTHROPATHY OF RIGHT SHOULDER: ICD-10-CM

## 2021-07-22 DIAGNOSIS — M12.811 ROTATOR CUFF TEAR ARTHROPATHY OF RIGHT SHOULDER: ICD-10-CM

## 2021-07-22 DIAGNOSIS — M25.511 RIGHT SHOULDER PAIN, UNSPECIFIED CHRONICITY: ICD-10-CM

## 2021-07-22 PROCEDURE — 73221 MRI JOINT UPR EXTREM W/O DYE: CPT

## 2021-08-16 ENCOUNTER — HOSPITAL ENCOUNTER (OUTPATIENT)
Dept: MRI IMAGING | Age: 71
Discharge: HOME | End: 2021-08-16
Payer: MEDICARE

## 2021-08-16 DIAGNOSIS — M54.2 NECK PAIN: ICD-10-CM

## 2021-08-16 DIAGNOSIS — M54.12 RADICULOPATHY, CERVICAL REGION: ICD-10-CM

## 2021-08-16 PROCEDURE — 72141 MRI NECK SPINE W/O DYE: CPT

## 2021-09-24 ENCOUNTER — PATIENT MESSAGE (OUTPATIENT)
Dept: FAMILY MEDICINE CLINIC | Age: 71
End: 2021-09-24

## 2021-10-07 ENCOUNTER — PATIENT MESSAGE (OUTPATIENT)
Dept: FAMILY MEDICINE CLINIC | Age: 71
End: 2021-10-07

## 2021-10-19 ENCOUNTER — PATIENT MESSAGE (OUTPATIENT)
Dept: ENDOCRINOLOGY | Age: 71
End: 2021-10-19

## 2021-10-22 ENCOUNTER — PATIENT MESSAGE (OUTPATIENT)
Dept: FAMILY MEDICINE CLINIC | Age: 71
End: 2021-10-22

## 2021-10-22 DIAGNOSIS — Z12.5 PROSTATE CANCER SCREENING: Primary | ICD-10-CM

## 2021-10-26 DIAGNOSIS — I10 ESSENTIAL HYPERTENSION: Chronic | ICD-10-CM

## 2021-10-26 DIAGNOSIS — E78.2 MIXED HYPERLIPIDEMIA: ICD-10-CM

## 2021-10-26 DIAGNOSIS — Z12.5 PROSTATE CANCER SCREENING: ICD-10-CM

## 2021-10-26 LAB
A/G RATIO: 2 (ref 1.1–2.2)
ALBUMIN SERPL-MCNC: 4.5 G/DL (ref 3.4–5)
ALP BLD-CCNC: 64 U/L (ref 40–129)
ALT SERPL-CCNC: 19 U/L (ref 10–40)
ANION GAP SERPL CALCULATED.3IONS-SCNC: 15 MMOL/L (ref 3–16)
AST SERPL-CCNC: 14 U/L (ref 15–37)
BILIRUB SERPL-MCNC: 0.5 MG/DL (ref 0–1)
BUN BLDV-MCNC: 27 MG/DL (ref 7–20)
CALCIUM SERPL-MCNC: 9.6 MG/DL (ref 8.3–10.6)
CHLORIDE BLD-SCNC: 100 MMOL/L (ref 99–110)
CHOLESTEROL, TOTAL: 108 MG/DL (ref 0–199)
CO2: 23 MMOL/L (ref 21–32)
CREAT SERPL-MCNC: 1 MG/DL (ref 0.8–1.3)
GFR AFRICAN AMERICAN: >60
GFR NON-AFRICAN AMERICAN: >60
GLOBULIN: 2.3 G/DL
GLUCOSE BLD-MCNC: 137 MG/DL (ref 70–99)
HDLC SERPL-MCNC: 25 MG/DL (ref 40–60)
LDL CHOLESTEROL CALCULATED: 24 MG/DL
POTASSIUM SERPL-SCNC: 3.6 MMOL/L (ref 3.5–5.1)
PROSTATE SPECIFIC ANTIGEN: 0.97 NG/ML (ref 0–4)
SODIUM BLD-SCNC: 138 MMOL/L (ref 136–145)
TOTAL PROTEIN: 6.8 G/DL (ref 6.4–8.2)
TRIGL SERPL-MCNC: 297 MG/DL (ref 0–150)
TSH REFLEX FT4: 2.07 UIU/ML (ref 0.27–4.2)
VLDLC SERPL CALC-MCNC: 59 MG/DL

## 2021-10-27 LAB
CREATININE URINE: 61 MG/DL (ref 39–259)
ESTIMATED AVERAGE GLUCOSE: 134.1 MG/DL
HBA1C MFR BLD: 6.3 %
MICROALBUMIN UR-MCNC: 1.5 MG/DL
MICROALBUMIN/CREAT UR-RTO: 24.6 MG/G (ref 0–30)

## 2021-10-28 ENCOUNTER — OFFICE VISIT (OUTPATIENT)
Dept: FAMILY MEDICINE CLINIC | Age: 71
End: 2021-10-28
Payer: MEDICARE

## 2021-10-28 VITALS
OXYGEN SATURATION: 95 % | DIASTOLIC BLOOD PRESSURE: 74 MMHG | HEART RATE: 62 BPM | SYSTOLIC BLOOD PRESSURE: 110 MMHG | HEIGHT: 70 IN | WEIGHT: 200 LBS | BODY MASS INDEX: 28.63 KG/M2

## 2021-10-28 DIAGNOSIS — E78.1 HYPERTRIGLYCERIDEMIA: ICD-10-CM

## 2021-10-28 DIAGNOSIS — Z00.00 ROUTINE GENERAL MEDICAL EXAMINATION AT A HEALTH CARE FACILITY: Primary | ICD-10-CM

## 2021-10-28 DIAGNOSIS — I10 ESSENTIAL HYPERTENSION: ICD-10-CM

## 2021-10-28 DIAGNOSIS — Z79.4 CONTROLLED TYPE 2 DIABETES MELLITUS WITH DIABETIC AUTONOMIC NEUROPATHY, WITH LONG-TERM CURRENT USE OF INSULIN (HCC): ICD-10-CM

## 2021-10-28 DIAGNOSIS — I25.10 CORONARY ARTERY DISEASE INVOLVING NATIVE CORONARY ARTERY OF NATIVE HEART WITHOUT ANGINA PECTORIS: ICD-10-CM

## 2021-10-28 DIAGNOSIS — E11.43 CONTROLLED TYPE 2 DIABETES MELLITUS WITH DIABETIC AUTONOMIC NEUROPATHY, WITH LONG-TERM CURRENT USE OF INSULIN (HCC): ICD-10-CM

## 2021-10-28 PROCEDURE — 4040F PNEUMOC VAC/ADMIN/RCVD: CPT | Performed by: FAMILY MEDICINE

## 2021-10-28 PROCEDURE — 1036F TOBACCO NON-USER: CPT | Performed by: FAMILY MEDICINE

## 2021-10-28 PROCEDURE — 3017F COLORECTAL CA SCREEN DOC REV: CPT | Performed by: FAMILY MEDICINE

## 2021-10-28 PROCEDURE — G0439 PPPS, SUBSEQ VISIT: HCPCS | Performed by: FAMILY MEDICINE

## 2021-10-28 PROCEDURE — G8484 FLU IMMUNIZE NO ADMIN: HCPCS | Performed by: FAMILY MEDICINE

## 2021-10-28 PROCEDURE — G8417 CALC BMI ABV UP PARAM F/U: HCPCS | Performed by: FAMILY MEDICINE

## 2021-10-28 PROCEDURE — 3044F HG A1C LEVEL LT 7.0%: CPT | Performed by: FAMILY MEDICINE

## 2021-10-28 PROCEDURE — G8427 DOCREV CUR MEDS BY ELIG CLIN: HCPCS | Performed by: FAMILY MEDICINE

## 2021-10-28 PROCEDURE — 99213 OFFICE O/P EST LOW 20 MIN: CPT | Performed by: FAMILY MEDICINE

## 2021-10-28 PROCEDURE — 2022F DILAT RTA XM EVC RTNOPTHY: CPT | Performed by: FAMILY MEDICINE

## 2021-10-28 PROCEDURE — 1123F ACP DISCUSS/DSCN MKR DOCD: CPT | Performed by: FAMILY MEDICINE

## 2021-10-28 RX ORDER — FENOFIBRATE 160 MG/1
160 TABLET ORAL DAILY
Qty: 90 TABLET | Refills: 3 | Status: SHIPPED | OUTPATIENT
Start: 2021-10-28

## 2021-10-28 ASSESSMENT — LIFESTYLE VARIABLES
HOW OFTEN DO YOU HAVE A DRINK CONTAINING ALCOHOL: 1
HOW MANY STANDARD DRINKS CONTAINING ALCOHOL DO YOU HAVE ON A TYPICAL DAY: 0
AUDIT-C TOTAL SCORE: 1
HOW OFTEN DO YOU HAVE SIX OR MORE DRINKS ON ONE OCCASION: 0

## 2021-10-28 ASSESSMENT — PATIENT HEALTH QUESTIONNAIRE - PHQ9
1. LITTLE INTEREST OR PLEASURE IN DOING THINGS: 0
SUM OF ALL RESPONSES TO PHQ QUESTIONS 1-9: 0
2. FEELING DOWN, DEPRESSED OR HOPELESS: 0
SUM OF ALL RESPONSES TO PHQ QUESTIONS 1-9: 0
SUM OF ALL RESPONSES TO PHQ9 QUESTIONS 1 & 2: 0
SUM OF ALL RESPONSES TO PHQ QUESTIONS 1-9: 0

## 2021-10-28 NOTE — PROGRESS NOTES
Medicare Annual Wellness Visit  Name: Harinder Beaulieu Date: 2021   MRN: <[id number]> Sex: Male   Age: 73 y.o. Ethnicity: Non- / Non    : 1950 Race: White (non-)      Rut Alvarez is here for Medicare AWV    Screenings for behavioral, psychosocial and functional/safety risks, and cognitive dysfunction are all negative except as indicated below. These results, as well as other patient data from the [address] form, are documented in Flowsheets linked to this Encounter. No Known Allergies      Prior to Visit Medications    Medication Sig Taking?  Authorizing Provider   fenofibrate (TRIGLIDE) 160 MG tablet Take 1 tablet by mouth daily Yes Nimo Eason MD   insulin regular human (HUMULIN R U-500 KWIKPEN) 500 UNIT/ML SOPN concentrated injection pen Inject 45-50 Units into the skin 3 times daily (before meals) Yes ANTON Rachel CNP   empagliflozin (JARDIANCE) 25 MG tablet Take 25 mg by mouth daily Yes ANTON Rachel CNP   Insulin Pen Needle (TRUEPLUS PEN NEEDLES) 31G X 8 MM MISC USE THREE TIMES A DAY Yes ANTON Rachel CNP   chlorthalidone (HYGROTON) 25 MG tablet TAKE 1 TABLET BY MOUTH ONE TIME A DAY Yes Inessa Manriquez MD   lisinopril (PRINIVIL;ZESTRIL) 40 MG tablet TAKE 1 TABLET BY MOUTH ONE TIME A DAY Yes Inessa Manriquez MD   potassium chloride (KLOR-CON M) 20 MEQ extended release tablet TAKE 1 TABLET BY MOUTH ONE TIME A DAY Yes Inessa Manriquez MD   atorvastatin (LIPITOR) 80 MG tablet TAKE 1 TABLET BY MOUTH ONCE DAILY Yes Inessa Manriquez MD   carvedilol (COREG) 25 MG tablet TAKE ONE TABLET BY MOUTH TWICE A DAY WITH MEALS Yes Inessa Manriquez MD   blood glucose test strips (PRODIGY NO CODING BLOOD GLUC) strip USE TO TEST BLOOD GLUCOSE LEVELS 4 TIMES DAILY Yes ANTON Rachel CNP   aspirin EC 81 MG EC tablet Take 1 tablet by mouth daily Yes José Miguel Ramires MD   Glucagon (BAQSIMI ONE PACK) 3 MG/DOSE POWD Use as per administration instructions as needed for low blood sugar Yes Santiago Velarde MD   Blood Glucose Monitoring Suppl (PRODIGY AUTOCODE BLOOD GLUCOSE) w/Device KIT Use to test blood glucose 4 times per day Yes Marquis Zack MD   PRODIGY LANCETS 28G MISC 1 each by Does not apply route 4 times daily (after meals and at bedtime) Yes Marquis Zack MD   Omega-3 Fatty Acids (FISH OIL) 1000 MG CAPS Take 3,000 mg by mouth 2 times daily  Yes Historical Provider, MD   Multiple Vitamins-Minerals (MULTIVITAMIN PO) Take  by mouth daily.  Yes Historical Provider, MD   SITagliptin (JANUVIA) 25 MG tablet Take 1 tablet by mouth daily  Patient not taking: Reported on 10/28/2021  Max Dy, APRN - CNP   insulin regular human (HUMULIN R U-500 KWIKPEN) 500 UNIT/ML SOPN concentrated injection pen Inject 45-50 Units into the skin 3 times daily (before meals)  Patient not taking: Reported on 10/28/2021  Max Dy, APRN - CNP         Past Medical History:   Diagnosis Date    CAD (coronary artery disease)     Epidermal cyst of neck 6/4/2019    Heart [facility] ([facility] [address] 3/2006    Hyperlipidemia     Hypertension     PONV (postoperative nausea and vomiting)     Type II or unspecified type diabetes mellitus without mention of complication, not stated as uncontrolled        Past Surgical History:   Procedure Laterality Date    CARDIAC SURGERY  2006    PTCA, stent LAD, Dr Herrera Marrow Right 09/2020    ELBOW SURGERY Right 1997    tennis   [address] [place] [address]  08/2020    SHOULDER SURGERY  2008    right rotator cuff tear, [place]    SHOULDER SURGERY Left 1/2015    TONSILLECTOMY  1979         Family History   Problem Relation Age of Onset    Cancer Mother         pancreas    Cancer Father         esophageal    Diabetes Sister     Diabetes Paternal Grandmother     Diabetes Paternal Grandfather        CareTeam (Including outside providers/suppliers regularly involved in providing care):   Patient Care Team:  Santiago Velarde MD as PCP - General hearing that hasn't been managed with hearing aids?: (!) Yes  Do you have difficulty driving, watching TV, or doing any of your daily activities because of your eyesight?: No  Have you had an eye exam within the past year?: Yes  Hearing/Vision Interventions:  · Hearing aids helpful. Personalized Preventive Plan   Current Health Maintenance Status  Immunization History   Administered Date(s) Administered    COVID-19, [employer], PF, 30mcg/0.3mL 02/22/2021, 03/15/2021    Influenza Virus Vaccine 09/19/2008, 11/06/2012    Influenza, High Dose (Fluzone 65 yrs and older) 11/21/2017, 11/07/2018, 11/19/2020    Influenza, Intradermal, Preservative free 10/30/2013, 11/03/2014    Influenza, Intradermal, Quadrivalent, Preservative Free 10/10/2016    Influenza, Triv, inactivated, subunit, adjuvanted, IM (Fluad 65 yrs and older) 11/04/2019    Pneumococcal Conjugate 13-valent ([id number]) 11/21/2017    Pneumococcal Polysaccharide ([id number]) 01/01/2007, 11/07/2018    Tdap (Boostrix, Adacel) 11/03/2014        Health Maintenance   Topic Date Due    Colon Cancer Screen FIT/FOBT  07/26/2017    Diabetic foot exam  01/04/2020    Annual Wellness Visit (AWV)  Never done    COVID-19 Vaccine (3 - Pfizer booster) 09/15/2021    Diabetic retinal exam  04/07/2022    A1C test (Diabetic or Prediabetic)  10/26/2022    Diabetic microalbuminuria test  10/26/2022    Lipid screen  10/26/2022    Potassium monitoring  10/26/2022    Creatinine monitoring  10/26/2022    DTaP/Tdap/Td vaccine (2 - Td or Tdap) 11/03/2024    Flu vaccine  Completed    Shingles Vaccine  Completed    Pneumococcal 65+ years Vaccine  Completed    AAA screen  Completed    Hepatitis C screen  Completed    Hepatitis A vaccine  Aged Out    Hib vaccine  Aged Out    Meningococcal (ACWY) vaccine  Aged Out     Recommendations for [employer] Due: see orders and patient instructions/AVS.  .   Recommended screening schedule for the next 5-10 years is provided to the patient in written form: see Patient Instructions/AVS.    Rise Section was seen today for medicare awv. Diagnoses and all orders for this visit:    Routine general medical examination at a health care facility    Hypertriglyceridemia  -     fenofibrate (TRIGLIDE) 160 MG tablet; Take 1 tablet by mouth daily  -     Lipid Panel; Future  -     ALT;  Future    Essential hypertension    Coronary artery disease involving native coronary artery of native heart without angina pectoris    Controlled type 2 diabetes mellitus with diabetic autonomic neuropathy, with long-term current use of insulin ([facility] [address]

## 2021-10-28 NOTE — PROGRESS NOTES
Assessment/Plan:    Shaylee Gutierres was seen today for medicare awv. Diagnoses and all orders for this visit:    Routine general medical examination at a health care facility    Hypertriglyceridemia  -     fenofibrate (TRIGLIDE) 160 MG tablet; Take 1 tablet by mouth daily, new rx  -     Lipid Panel; Future  -     ALT; Future    Essential hypertension   Controlled with lisin, chlorthalicone, coreg    Coronary artery disease involving native coronary artery of native heart without angina pectoris   Stable per recent cards appt    Controlled type 2 diabetes mellitus with diabetic autonomic neuropathy, with long-term current use of insulin ([address]   Progress applauded      Patient Instructions   PLEASE consider doing a Cologuard stool test. WHEN your are ready to proceed, please contact the office. Personalized Preventive Plan for Chuy Blocker - 10/28/2021  Medicare offers a range of preventive health benefits. Some of the tests and screenings are paid in full while other may be subject to a deductible, co-insurance, and/or copay. Some of these benefits include a comprehensive review of your medical history including lifestyle, illnesses that may run in your family, and various assessments and screenings as appropriate. After reviewing your medical record and screening and assessments performed today your provider may have ordered immunizations, labs, imaging, and/or referrals for you. A list of these orders (if applicable) as well as your Preventive Care list are included within your After Visit Summary for your review. Other Preventive Recommendations:    · A preventive eye exam performed by an eye specialist is recommended every 1-2 years to screen for glaucoma; cataracts, macular degeneration, and other eye disorders. · A preventive dental visit is recommended every 6 months. · Try to get at least 150 minutes of exercise per week or 10,000 steps per day on a pedometer .   · Order or download the FREE \"Exercise & Physical Activity: Your Everyday Guide\" from The [employer]. Call 2-584.684.6832 or search The [employer] online. · You need 1937-8682 mg of calcium and 6465-1854 IU of vitamin D per day. It is possible to meet your calcium requirement with diet alone, but a vitamin D supplement is usually necessary to meet this goal.  · When exposed to the sun, use a sunscreen that protects against both UVA and UVB radiation with an SPF of 30 or greater. Reapply every 2 to 3 hours or after sweating, drying off with a towel, or swimming. · Always wear a seat belt when traveling in a car. Always wear a helmet when riding a bicycle or motorcycle. Patient: Cherrie Gold is a 73 y.o.male who presents today with the following Chief Complaint(s):  Chief Complaint   Patient presents with    Medicare AWV         HPI: Pt with DM2/1.5? Marya Alejo), CAD s/p MI 2006 (Dr Erik Hodgson q yr), hld and htn  Is on triple bp therapy. Feet neuropathy remains much improved since DM came under control 2014, stable lately. Is on humulin R 45 u tid with meals, does not miss. Will receive DexCom tomorrow.      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 Pt follows his bp as in 2017 had on/off L eye vision loss that resolved on its own. JOSEPH CHEN suggested sx's were 2/2 uncontrolled bp. No further sx's. 6/2021 cataract surgery marlin twell. Was told no aneurysms were found.      Conts to go to gym few days per wk. Had 10/26/21 labs. Chol 108/297/25/24, similar to previous readings, despite lipitor 80. Pt was on fenofibrate in past.     Increase activity, lose wt, decrease etoh, stop tob, choose better fats (olive oil and coconut oil, avoid trans fats), increase fish, purple produce    Saw Dr Federico Marte for recurrent R shoulder pain. Had EMG by Dr Rehan Herrera. Was told shoulder pain and weakness from nerve damage. Pt has resumed gym workouts and RUE strength returned to nl. However, post nedk is painful.  MRI showed neck muscle spasm and foraminal stenosis. Pt is considering seeing Dr Ethan Luu, [facility]. The ASCVD Risk score (Faina Bai., et al., 2013) failed to calculate for the following reasons:     The valid total cholesterol range is 130 to 320 mg/dL      Lab Results   Component Value Date    LABA1C 6.3 10/26/2021    LABA1C 7.4 03/03/2021    LABA1C 7.3 10/28/2020     :      Current Outpatient Medications   Medication Sig Dispense Refill    fenofibrate (TRIGLIDE) 160 MG tablet Take 1 tablet by mouth daily 90 tablet 3    insulin regular human (HUMULIN R U-500 KWIKPEN) 500 UNIT/ML SOPN concentrated injection pen Inject 45-50 Units into the skin 3 times daily (before meals) 10 pen 2    empagliflozin (JARDIANCE) 25 MG tablet Take 25 mg by mouth daily 30 tablet 3    Insulin Pen Needle (TRUEPLUS PEN NEEDLES) 31G X 8 MM MISC USE THREE TIMES A  each 5    chlorthalidone (HYGROTON) 25 MG tablet TAKE 1 TABLET BY MOUTH ONE TIME A DAY 90 tablet 3    lisinopril (PRINIVIL;ZESTRIL) 40 MG tablet TAKE 1 TABLET BY MOUTH ONE TIME A DAY 90 tablet 3    potassium chloride (KLOR-CON M) 20 MEQ extended release tablet TAKE 1 TABLET BY MOUTH ONE TIME A DAY 90 tablet 3    atorvastatin (LIPITOR) 80 MG tablet TAKE 1 TABLET BY MOUTH ONCE DAILY 90 tablet 3    carvedilol (COREG) 25 MG tablet TAKE ONE TABLET BY MOUTH TWICE A DAY WITH MEALS 180 tablet 4    blood glucose test strips (PRODIGY NO CODING BLOOD GLUC) strip USE TO TEST BLOOD GLUCOSE LEVELS 4 TIMES DAILY 360 each 2    aspirin EC 81 MG EC tablet Take 1 tablet by mouth daily 90 tablet 3    Glucagon (BAQSIMI ONE PACK) 3 MG/DOSE POWD Use as per administration instructions as needed for low blood sugar 1 each 3    Blood Glucose Monitoring Suppl (PRODIGY AUTOCODE BLOOD GLUCOSE) w/Device KIT Use to test blood glucose 4 times per day 1 kit 0    PRODIGY LANCETS 28G MISC 1 each by Does not apply route 4 times daily (after meals and at bedtime) 400 each 3    Omega-3 Fatty Acids (FISH OIL) 1000 MG CAPS Take 3,000 mg by mouth 2 times daily       Multiple Vitamins-Minerals (MULTIVITAMIN PO) Take  by mouth daily.  SITagliptin (JANUVIA) 25 MG tablet Take 1 tablet by mouth daily (Patient not taking: Reported on 10/28/2021) 30 tablet 3    insulin regular human (HUMULIN R U-500 KWIKPEN) 500 UNIT/ML SOPN concentrated injection pen Inject 45-50 Units into the skin 3 times daily (before meals) (Patient not taking: Reported on 10/28/2021) 10 pen 2     No current facility-administered medications for this visit. Patient's past medical history,surgical history, family history, medications,  and allergies  were all reviewed and updated as appropriate today. Review of Systems  abv    Physical Exam  Constitutional:       General: He is not in acute distress. Appearance: Normal appearance. He is well-developed. He is not ill-appearing. HENT:      Head: Normocephalic and atraumatic. Right Ear: Tympanic membrane, ear canal and external ear normal.      Left Ear: Tympanic membrane, ear canal and external ear normal.      Mouth/Throat:      Pharynx: Oropharynx is clear. No oropharyngeal exudate. Eyes:      General: No scleral icterus. Right eye: No discharge. Left eye: No discharge. Extraocular Movements: Extraocular movements intact. Conjunctiva/sclera: Conjunctivae normal.   Neck:      Vascular: No carotid bruit. Comments: Neg TMG  Cardiovascular:      Rate and Rhythm: Normal rate and regular rhythm. Pulses: Normal pulses. Heart sounds: Normal heart sounds. No murmur heard. Pulmonary:      Effort: Pulmonary effort is normal. No respiratory distress. Breath sounds: Normal breath sounds. Abdominal:      General: Bowel sounds are normal. There is no distension. Palpations: Abdomen is soft. There is no mass. Tenderness: There is no abdominal tenderness. Musculoskeletal:         General: Normal range of motion. Cervical back: Normal range of motion and neck supple. Right lower leg: No edema. Left lower leg: No edema. Lymphadenopathy:      Cervical: No cervical adenopathy. Skin:     General: Skin is warm and dry. Capillary Refill: Capillary refill takes less than 2 seconds. Coloration: Skin is not jaundiced or pale. Findings: No rash. Neurological:      General: No focal deficit present. Mental Status: He is alert and oriented to person, place, and time. Cranial Nerves: No cranial nerve deficit. Deep Tendon Reflexes: Reflexes are normal and symmetric. Psychiatric:         Mood and Affect: Mood normal.         Behavior: Behavior normal.         Thought Content:  Thought content normal.         Judgment: Judgment normal.           /74   Pulse 62   Ht 5' 10\" (1.778 m)   Wt 200 lb (90.7 kg)   SpO2 95%   BMI 28.70 kg/m²

## 2021-10-28 NOTE — PATIENT INSTRUCTIONS
PLEASE consider doing a Cologuard stool test. WHEN your are ready to proceed, please contact the office. Personalized Preventive Plan for Chuy Blocker - 10/28/2021  Medicare offers a range of preventive health benefits. Some of the tests and screenings are paid in full while other may be subject to a deductible, co-insurance, and/or copay. Some of these benefits include a comprehensive review of your medical history including lifestyle, illnesses that may run in your family, and various assessments and screenings as appropriate. After reviewing your medical record and screening and assessments performed today your provider may have ordered immunizations, labs, imaging, and/or referrals for you. A list of these orders (if applicable) as well as your Preventive Care list are included within your After Visit Summary for your review. Other Preventive Recommendations:    · A preventive eye exam performed by an eye specialist is recommended every 1-2 years to screen for glaucoma; cataracts, macular degeneration, and other eye disorders. · A preventive dental visit is recommended every 6 months. · Try to get at least 150 minutes of exercise per week or 10,000 steps per day on a pedometer . · Order or download the FREE \"Exercise & Physical Activity: Your Everyday Guide\" from The "[employer]. Call 8-643.764.6067 or search The "[employer] Data on Aging online. · You need 5646-6214 mg of calcium and 9469-8588 IU of vitamin D per day. It is possible to meet your calcium requirement with diet alone, but a vitamin D supplement is usually necessary to meet this goal.  · When exposed to the sun, use a sunscreen that protects against both UVA and UVB radiation with an SPF of 30 or greater. Reapply every 2 to 3 hours or after sweating, drying off with a towel, or swimming. · Always wear a seat belt when traveling in a car. Always wear a helmet when riding a bicycle or motorcycle.

## 2021-11-17 RX ORDER — INSULIN HUMAN 500 [IU]/ML
45-50 INJECTION, SOLUTION SUBCUTANEOUS
Qty: 10 EACH | Refills: 5 | Status: SHIPPED | OUTPATIENT
Start: 2021-11-17